# Patient Record
Sex: FEMALE | Race: WHITE | NOT HISPANIC OR LATINO | Employment: FULL TIME | URBAN - METROPOLITAN AREA
[De-identification: names, ages, dates, MRNs, and addresses within clinical notes are randomized per-mention and may not be internally consistent; named-entity substitution may affect disease eponyms.]

---

## 2018-06-04 ENCOUNTER — PATIENT OUTREACH (OUTPATIENT)
Dept: ADMINISTRATIVE | Facility: HOSPITAL | Age: 58
End: 2018-06-04

## 2018-06-04 NOTE — LETTER
June 4, 2018        Zully Jerry  [address]  [place] [address]      Dear Mrs. Jerry,    [facility] is committed to your overall health.  To help you get the most out of each of your visits, we will review your information to make sure you are up to date on all of your recommended tests and/or procedures.      DR. CHAN MCGILL has found that you may be due for   Health Maintenance Due   Topic    TETANUS VACCINE     Colonoscopy     DEXA SCAN     Pap Smear     Lipid Panel     Mammogram     Zoster Vaccine         If you have had any of the above done at another facility, please bring the records or information with you so that your record at [facility] will be complete.    We will be happy to assist you with scheduling any necessary appointments or you may contact the [facility] appointment desk at [phone] to schedule at your convenience.     Thank you for choosing [facility] for your healthcare needs,    Giovana REDDY, LPN Care Coordinator  [facility]  [phone]

## 2020-06-03 ENCOUNTER — HISTORICAL (OUTPATIENT)
Dept: URGENT CARE | Facility: CLINIC | Age: 60
End: 2020-06-03

## 2020-06-05 LAB — FINAL CULTURE: NORMAL

## 2020-11-20 ENCOUNTER — TELEPHONE (OUTPATIENT)
Dept: DERMATOLOGY | Facility: CLINIC | Age: 60
End: 2020-11-20

## 2022-04-07 ENCOUNTER — HISTORICAL (OUTPATIENT)
Dept: ADMINISTRATIVE | Facility: HOSPITAL | Age: 62
End: 2022-04-07
Payer: MEDICAID

## 2022-04-16 ENCOUNTER — HOSPITAL ENCOUNTER (INPATIENT)
Facility: HOSPITAL | Age: 62
LOS: 4 days | Discharge: HOME | DRG: 683 | End: 2022-04-20
Attending: EMERGENCY MEDICINE | Admitting: STUDENT IN AN ORGANIZED HEALTH CARE EDUCATION/TRAINING PROGRAM
Payer: MEDICARE

## 2022-04-16 DIAGNOSIS — R10.9 ABDOMINAL PAIN: ICD-10-CM

## 2022-04-16 DIAGNOSIS — F19.10 SUBSTANCE ABUSE: ICD-10-CM

## 2022-04-16 DIAGNOSIS — N17.9 ACUTE RENAL FAILURE, UNSPECIFIED ACUTE RENAL FAILURE TYPE: Primary | ICD-10-CM

## 2022-04-16 DIAGNOSIS — N17.9 AKI (ACUTE KIDNEY INJURY): ICD-10-CM

## 2022-04-16 DIAGNOSIS — F19.90 ILLICIT DRUG USE: ICD-10-CM

## 2022-04-16 LAB
ALBUMIN SERPL BCP-MCNC: 2.7 G/DL (ref 3.5–5.2)
ALP SERPL-CCNC: 71 U/L (ref 55–135)
ALT SERPL W/O P-5'-P-CCNC: 41 U/L (ref 10–44)
AMPHET+METHAMPHET UR QL: ABNORMAL
AMYLASE SERPL-CCNC: 87 U/L (ref 20–110)
ANION GAP SERPL CALC-SCNC: 12 MMOL/L (ref 8–16)
AST SERPL-CCNC: 39 U/L (ref 10–40)
BACTERIA #/AREA URNS HPF: NEGATIVE /HPF
BARBITURATES UR QL SCN>200 NG/ML: NEGATIVE
BASOPHILS NFR BLD: 0 % (ref 0–1.9)
BENZODIAZ UR QL SCN>200 NG/ML: ABNORMAL
BILIRUB SERPL-MCNC: 0.4 MG/DL (ref 0.1–1)
BILIRUB UR QL STRIP: ABNORMAL
BUN SERPL-MCNC: 81 MG/DL (ref 8–23)
BZE UR QL SCN: ABNORMAL
CALCIUM SERPL-MCNC: 9 MG/DL (ref 8.7–10.5)
CANNABINOIDS UR QL SCN: NEGATIVE
CHLORIDE SERPL-SCNC: 105 MMOL/L (ref 95–110)
CLARITY UR: ABNORMAL
CO2 SERPL-SCNC: 23 MMOL/L (ref 23–29)
COLOR UR: YELLOW
CREAT SERPL-MCNC: 5.8 MG/DL (ref 0.5–1.4)
CREAT UR-MCNC: 203 MG/DL (ref 15–325)
CTP QC/QA: YES
DIFFERENTIAL METHOD: ABNORMAL
EOSINOPHIL NFR BLD: 0 % (ref 0–8)
ERYTHROCYTE [DISTWIDTH] IN BLOOD BY AUTOMATED COUNT: 13.2 % (ref 11.5–14.5)
EST. GFR  (AFRICAN AMERICAN): 8.2 ML/MIN/1.73 M^2
EST. GFR  (NON AFRICAN AMERICAN): 7.1 ML/MIN/1.73 M^2
ETHANOL SERPL-MCNC: <3 MG/DL
GLUCOSE SERPL-MCNC: 140 MG/DL (ref 70–110)
GLUCOSE UR QL STRIP: NEGATIVE
GRAN CASTS #/AREA URNS LPF: 70.6 /LPF
HCT VFR BLD AUTO: 36.4 % (ref 37–48.5)
HGB BLD-MCNC: 12.1 G/DL (ref 12–16)
HGB UR QL STRIP: NEGATIVE
HYALINE CASTS #/AREA URNS LPF: 0 /LPF
IMM GRANULOCYTES # BLD AUTO: ABNORMAL K/UL (ref 0–0.04)
IMM GRANULOCYTES NFR BLD AUTO: ABNORMAL % (ref 0–0.5)
KETONES UR QL STRIP: ABNORMAL
LACTATE SERPL-SCNC: 2.4 MMOL/L (ref 0.5–2.2)
LACTATE SERPL-SCNC: 2.4 MMOL/L (ref 0.5–2.2)
LEUKOCYTE ESTERASE UR QL STRIP: ABNORMAL
LIPASE SERPL-CCNC: 116 U/L (ref 23–300)
LYMPHOCYTES NFR BLD: 2 % (ref 18–48)
MCH RBC QN AUTO: 28.7 PG (ref 27–31)
MCHC RBC AUTO-ENTMCNC: 33.2 G/DL (ref 32–36)
MCV RBC AUTO: 86 FL (ref 82–98)
METAMYELOCYTES NFR BLD MANUAL: 3 %
METHADONE UR QL SCN>300 NG/ML: NEGATIVE
MICROSCOPIC COMMENT: ABNORMAL
MONOCYTES NFR BLD: 0 % (ref 4–15)
MYELOCYTES NFR BLD MANUAL: 1 %
NEUTROPHILS NFR BLD: 72 % (ref 38–73)
NEUTS BAND NFR BLD MANUAL: 22 %
NITRITE UR QL STRIP: NEGATIVE
NRBC BLD-RTO: 0 /100 WBC
OPIATES UR QL SCN: NEGATIVE
PCP UR QL SCN>25 NG/ML: NEGATIVE
PH UR STRIP: 5 [PH] (ref 5–8)
PLATELET # BLD AUTO: 130 K/UL (ref 150–450)
PMV BLD AUTO: 13.4 FL (ref 9.2–12.9)
POTASSIUM SERPL-SCNC: 4 MMOL/L (ref 3.5–5.1)
PROT SERPL-MCNC: 6.7 G/DL (ref 6–8.4)
PROT UR QL STRIP: ABNORMAL
RBC # BLD AUTO: 4.22 M/UL (ref 4–5.4)
RBC #/AREA URNS HPF: 13 /HPF (ref 0–4)
SARS-COV-2 RDRP RESP QL NAA+PROBE: NEGATIVE
SODIUM SERPL-SCNC: 140 MMOL/L (ref 136–145)
SP GR UR STRIP: 1.01 (ref 1–1.03)
SQUAMOUS #/AREA URNS HPF: 12 /HPF
TOXICOLOGY INFORMATION: ABNORMAL
TROPONIN I SERPL DL<=0.01 NG/ML-MCNC: 10.6 PG/ML (ref 0–60)
URN SPEC COLLECT METH UR: ABNORMAL
UROBILINOGEN UR STRIP-ACNC: 1 EU/DL
WBC # BLD AUTO: 37.28 K/UL (ref 3.9–12.7)
WBC #/AREA URNS HPF: 64 /HPF (ref 0–5)
YEAST URNS QL MICRO: ABNORMAL

## 2022-04-16 PROCEDURE — 25000003 PHARM REV CODE 250: Performed by: EMERGENCY MEDICINE

## 2022-04-16 PROCEDURE — 20000000 HC ICU ROOM

## 2022-04-16 PROCEDURE — 83690 ASSAY OF LIPASE: CPT | Performed by: EMERGENCY MEDICINE

## 2022-04-16 PROCEDURE — 83605 ASSAY OF LACTIC ACID: CPT | Mod: 91 | Performed by: EMERGENCY MEDICINE

## 2022-04-16 PROCEDURE — U0002 COVID-19 LAB TEST NON-CDC: HCPCS | Performed by: EMERGENCY MEDICINE

## 2022-04-16 PROCEDURE — 80307 DRUG TEST PRSMV CHEM ANLYZR: CPT | Performed by: EMERGENCY MEDICINE

## 2022-04-16 PROCEDURE — 36415 COLL VENOUS BLD VENIPUNCTURE: CPT | Performed by: INTERNAL MEDICINE

## 2022-04-16 PROCEDURE — 85027 COMPLETE CBC AUTOMATED: CPT | Performed by: EMERGENCY MEDICINE

## 2022-04-16 PROCEDURE — 84145 PROCALCITONIN (PCT): CPT | Performed by: INTERNAL MEDICINE

## 2022-04-16 PROCEDURE — 82077 ASSAY SPEC XCP UR&BREATH IA: CPT | Performed by: EMERGENCY MEDICINE

## 2022-04-16 PROCEDURE — 96361 HYDRATE IV INFUSION ADD-ON: CPT

## 2022-04-16 PROCEDURE — 99285 EMERGENCY DEPT VISIT HI MDM: CPT | Mod: 25

## 2022-04-16 PROCEDURE — 84484 ASSAY OF TROPONIN QUANT: CPT | Performed by: EMERGENCY MEDICINE

## 2022-04-16 PROCEDURE — 81000 URINALYSIS NONAUTO W/SCOPE: CPT | Performed by: EMERGENCY MEDICINE

## 2022-04-16 PROCEDURE — 87040 BLOOD CULTURE FOR BACTERIA: CPT | Performed by: EMERGENCY MEDICINE

## 2022-04-16 PROCEDURE — 80053 COMPREHEN METABOLIC PANEL: CPT | Performed by: EMERGENCY MEDICINE

## 2022-04-16 PROCEDURE — 82150 ASSAY OF AMYLASE: CPT | Performed by: EMERGENCY MEDICINE

## 2022-04-16 PROCEDURE — 63600175 PHARM REV CODE 636 W HCPCS: Performed by: EMERGENCY MEDICINE

## 2022-04-16 PROCEDURE — 36415 COLL VENOUS BLD VENIPUNCTURE: CPT | Performed by: EMERGENCY MEDICINE

## 2022-04-16 PROCEDURE — 85007 BL SMEAR W/DIFF WBC COUNT: CPT | Performed by: EMERGENCY MEDICINE

## 2022-04-16 PROCEDURE — 96374 THER/PROPH/DIAG INJ IV PUSH: CPT

## 2022-04-16 PROCEDURE — 87086 URINE CULTURE/COLONY COUNT: CPT | Performed by: EMERGENCY MEDICINE

## 2022-04-16 PROCEDURE — 51702 INSERT TEMP BLADDER CATH: CPT

## 2022-04-16 RX ORDER — DIPHENHYDRAMINE HCL 25 MG
25 CAPSULE ORAL NIGHTLY PRN
Status: DISCONTINUED | OUTPATIENT
Start: 2022-04-16 | End: 2022-04-18

## 2022-04-16 RX ORDER — ONDANSETRON 2 MG/ML
4 INJECTION INTRAMUSCULAR; INTRAVENOUS EVERY 8 HOURS PRN
Status: DISCONTINUED | OUTPATIENT
Start: 2022-04-16 | End: 2022-04-19

## 2022-04-16 RX ORDER — SODIUM CHLORIDE 0.9 % (FLUSH) 0.9 %
10 SYRINGE (ML) INJECTION
Status: DISCONTINUED | OUTPATIENT
Start: 2022-04-16 | End: 2022-04-20 | Stop reason: HOSPADM

## 2022-04-16 RX ORDER — SODIUM CHLORIDE 9 MG/ML
INJECTION, SOLUTION INTRAVENOUS CONTINUOUS
Status: DISCONTINUED | OUTPATIENT
Start: 2022-04-16 | End: 2022-04-20 | Stop reason: HOSPADM

## 2022-04-16 RX ORDER — ALPRAZOLAM 0.5 MG/1
0.5 TABLET ORAL
Status: COMPLETED | OUTPATIENT
Start: 2022-04-16 | End: 2022-04-16

## 2022-04-16 RX ORDER — FAMOTIDINE 20 MG/1
20 TABLET, FILM COATED ORAL DAILY
Status: DISCONTINUED | OUTPATIENT
Start: 2022-04-17 | End: 2022-04-20 | Stop reason: HOSPADM

## 2022-04-16 RX ORDER — NOREPINEPHRINE BITARTRATE/D5W 4MG/250ML
0-3 PLASTIC BAG, INJECTION (ML) INTRAVENOUS CONTINUOUS
Status: DISPENSED | OUTPATIENT
Start: 2022-04-16 | End: 2022-04-17

## 2022-04-16 RX ADMIN — SODIUM CHLORIDE: 0.9 INJECTION, SOLUTION INTRAVENOUS at 10:04

## 2022-04-16 RX ADMIN — SODIUM CHLORIDE 1000 ML: 0.9 INJECTION, SOLUTION INTRAVENOUS at 04:04

## 2022-04-16 RX ADMIN — SODIUM CHLORIDE 1000 ML: 0.9 INJECTION, SOLUTION INTRAVENOUS at 01:04

## 2022-04-16 RX ADMIN — PIPERACILLIN AND TAZOBACTAM 4.5 G: 4; .5 INJECTION, POWDER, LYOPHILIZED, FOR SOLUTION INTRAVENOUS; PARENTERAL at 06:04

## 2022-04-16 RX ADMIN — ALPRAZOLAM 0.5 MG: 0.5 TABLET ORAL at 06:04

## 2022-04-16 RX ADMIN — SODIUM CHLORIDE 1000 ML: 0.9 INJECTION, SOLUTION INTRAVENOUS at 06:04

## 2022-04-16 RX ADMIN — PIPERACILLIN AND TAZOBACTAM 4.5 G: 4; .5 INJECTION, POWDER, LYOPHILIZED, FOR SOLUTION INTRAVENOUS; PARENTERAL at 10:04

## 2022-04-16 NOTE — Clinical Note
Diagnosis: Acute renal failure, unspecified acute renal failure type [[id number]]   Admitting Provider:: JEAN PIERRE SMITH [[id number]]   Future Attending Provider: DIANNE LYONS JR [[id number]]   Reason for IP Medical Treatment  (Clinical interventions that can only be accomplished in the IP setting? ) :: Acute renal failure, substance abuse   Estimated Length of Stay:: 2 midnights   I certify that Inpatient services for greater than or equal to 2 midnights are medically necessary:: Yes   Plans for Post-Acute care--if anticipated (pick the single best option):: A. No post acute care anticipated at this time   Special Needs:: No Special Needs [1]

## 2022-04-16 NOTE — ED PROVIDER NOTES
"Encounter Date: 2022       History     Chief Complaint   Patient presents with    Abdominal Pain     Complains of generalized abd pain x 1 week. States she has hx of pancreatitis and states she has "flare ups." Reports nausea and vomiting last night. Weakness.     This 62-year-old female with history of bipolar disorder, GERD, hyperlipidemia, hypertension presents the emergency department with multiple episodes of pancreatitis over the past few days.  Complaining of epigastric pain as well as some nausea and vomiting.  On and off for the past few weeks.  Complaining of generalized weakness as well.  She is not ill appearing, alert oriented x4, GCS is 15. She is very talkative.          Review of patient's allergies indicates:  No Known Allergies  Past Medical History:   Diagnosis Date    Bipolar affective     [facility]    Degenerative disc disease     cervical spine    GERD (gastroesophageal reflux disease)     Hepatitis C     Genotype 1a, no previous tx, G1S1 on Bx 2010    Hyperlipidemia     Hypertension     Migraine headache     Raynaud's disease      Past Surgical History:   Procedure Laterality Date    BACK SURGERY      BREAST SURGERY      lumpectomy    TUBAL LIGATION      one tube and ovary removed for cyst     Family History   Problem Relation Age of Onset    Hyperlipidemia Mother     Diabetes Mother     Kidney disease Mother     Heart disease Mother     Heart disease Father     Diabetes Father     Cancer Sister         not sure  but knows its female Ca.    Cancer Paternal Grandmother         uterine Cancer     Social History     Tobacco Use    Smoking status: Former Smoker     Packs/day: 0.25     Types: Cigarettes     Quit date: 10/16/2014     Years since quittin.5    Smokeless tobacco: Never Used   Substance Use Topics    Alcohol use: No     Comment: quit ~10-12 yrs ago; previously heavy use    Drug use: No     Review of Systems   Constitutional: " Negative for fever.   HENT: Negative for sore throat.    Respiratory: Negative for shortness of breath.    Cardiovascular: Negative for chest pain.   Gastrointestinal: Positive for abdominal pain. Negative for nausea.   Genitourinary: Negative for dysuria.   Musculoskeletal: Negative for back pain.   Skin: Negative for rash.   Neurological: Negative for weakness.   Hematological: Does not bruise/bleed easily.       Physical Exam     Initial Vitals [04/16/22 1317]   BP Pulse Resp Temp SpO2   (!) 75/61 107 (!) 22 97.8 °F (36.6 °C) 98 %      MAP       --         Physical Exam    Nursing note and vitals reviewed.  Constitutional: She appears well-developed and well-nourished. She is not diaphoretic. No distress.   HENT:   Head: Normocephalic and atraumatic.   Eyes: Conjunctivae and EOM are normal. Pupils are equal, round, and reactive to light.   Neck: Neck supple.   Normal range of motion.  Cardiovascular: Normal rate, regular rhythm, normal heart sounds and intact distal pulses.   No murmur heard.  Pulmonary/Chest: Breath sounds normal. No respiratory distress. She has no wheezes. She has no rhonchi. She has no rales. She exhibits no tenderness.   Abdominal: Abdomen is soft. Bowel sounds are normal. She exhibits no distension. There is abdominal tenderness. There is no rebound and no guarding.   Musculoskeletal:         General: No tenderness or edema. Normal range of motion.      Cervical back: Normal range of motion and neck supple.     Neurological: She is alert and oriented to person, place, and time. She has normal strength. No cranial nerve deficit. GCS score is 15. GCS eye subscore is 4. GCS verbal subscore is 5. GCS motor subscore is 6.   Skin: Skin is warm and dry. Capillary refill takes less than 2 seconds.         ED Course   Procedures  Labs Reviewed   CBC W/ AUTO DIFFERENTIAL - Abnormal; Notable for the following components:       Result Value    WBC 37.28 (*)     Hematocrit 36.4 (*)     Platelets 130 (*)      MPV 13.4 (*)     Lymph % 2.0 (*)     Mono % 0.0 (*)     All other components within normal limits   COMPREHENSIVE METABOLIC PANEL - Abnormal; Notable for the following components:    Glucose 140 (*)     BUN 81 (*)     Creatinine 5.8 (*)     Albumin 2.7 (*)     eGFR if  8.2 (*)     eGFR if non  7.1 (*)     All other components within normal limits   LACTIC ACID, PLASMA - Abnormal; Notable for the following components:    Lactate (Lactic Acid) 2.4 (*)     All other components within normal limits   URINALYSIS, REFLEX TO URINE CULTURE - Abnormal; Notable for the following components:    Appearance, UA Cloudy (*)     Protein, UA 2+ (*)     Ketones, UA Trace (*)     Bilirubin (UA) 2+ (*)     Leukocytes, UA 2+ (*)     All other components within normal limits    Narrative:     Preferred Collection Type->Urine, Clean Catch  Specimen Source->Urine   DRUG SCREEN PANEL, URINE EMERGENCY - Abnormal; Notable for the following components:    Benzodiazepines Presumptive Positive (*)     Cocaine (Metab.) Presumptive Positive (*)     Amphetamine Screen, Ur Presumptive Positive (*)     All other components within normal limits    Narrative:     Preferred Collection Type->Urine, Clean Catch  Specimen Source->Urine   URINALYSIS MICROSCOPIC - Abnormal; Notable for the following components:    RBC, UA 13 (*)     WBC, UA 64 (*)     Yeast, UA Rare (*)     Granular Casts, UA 70.6 (*)     All other components within normal limits    Narrative:     Preferred Collection Type->Urine, Clean Catch  Specimen Source->Urine   CULTURE, BLOOD   CULTURE, BLOOD   CULTURE, URINE   AMYLASE   LIPASE   TROPONIN I HIGH SENSITIVITY   ALCOHOL,MEDICAL (ETHANOL)   SARS-COV-2 RDRP GENE          Imaging Results          CT Abdomen Pelvis  Without Contrast (Final result)  Result time 04/16/22 17:03:18    Final result by Fercho Solares MD (04/16/22 17:03:18)                 Impression:      1. No acute inflammatory process  detected within the abdomen or pelvis on this noncontrast study.  2. Nonobstructing caliceal calculi within bilateral kidneys.      Electronically signed by: Fercho Solares MD  Date:    04/16/2022  Time:    17:03             Narrative:    EXAMINATION:  CT ABDOMEN PELVIS WITHOUT CONTRAST    CLINICAL HISTORY:  Abdominal pain, acute, nonlocalized;    TECHNIQUE:  Axial CT images were obtained from the lung bases through the pelvis without oral or intravenous contrast.  Multiplanar reconstructions evaluated.  Iterative reconstruction technique was used.  CT/Cardiac nuclear examinations in the past 12 months: 0    COMPARISON:  No priors available    FINDINGS:  Mild dependent atelectatic change in the lower lobes.  Lack of intravenous and oral contrast limits evaluation of the abdominal and pelvic structures.  Unenhanced liver, spleen, adrenals, pancreas are unremarkable.  The gallbladder is either collapsed or absent.  Two 2 mm caliceal calculi within the right kidney.  Two 2 mm caliceal calculi left kidney.  1.4 cm cyst within the left kidney.  Collapsed urinary bladder.  No bladder wall thickening or radiodense bladder calculi.  No bowel wall thickening or pattern of bowel obstruction.  Normal appendix.  No adenopathy, free air, or free fluid.  Spondylotic change at the lumbosacral junction.                                 Medications   sodium chloride 0.9% bolus 1,000 mL (0 mLs Intravenous Stopped 4/16/22 1620)   sodium chloride 0.9% bolus 1,000 mL (1,000 mLs Intravenous New Bag 4/16/22 1620)     Medical Decision Making:   Differential Diagnosis:   Pancreatitis, generalized abdominal pain  ED Management:  Patient with mild abdominal pain, mild hypotension and acute renal failure.  UDS is positive for benzodiazepines, cocaine and methamphetamine.  Patient denies cocaine and methamphetamine use.  On reexamination, her abdomen is soft, nonsurgical abdomen.  Her white count is elevated, but she is afebrile, questionable  stress reaction.  CT scan of abdomen was negative for acute process.  Will admit here for IV fluids, nephrology consult, and repeat labs.             ED Course as of 04/16/22 1737   Sat Apr 16, 2022   1429 WBC(!): 37.28 [SD]   1429 BUN(!): 81 [SD]   1429 Creatinine(!): 5.8 [SD]   1429 Lactate, Dat(!): 2.4 [SD]   1723 Patient denying cocaine or amphetamine use [SD]   1728 Discussed case with Dr. Bah, recommends IV fluids, repeat labs, Nephrology consult [SD]      ED Course User Index  [SD] Cortez Thomas MD             Clinical Impression:   Final diagnoses:  [R10.9] Abdominal pain  [N17.9] Acute renal failure, unspecified acute renal failure type (Primary)  [F19.10] Substance abuse          ED Disposition Condition    Admit               Cortez Thomas MD  04/16/22 1735       Cortez Thomas MD  04/16/22 1735

## 2022-04-17 PROBLEM — N17.9 AKI (ACUTE KIDNEY INJURY): Status: ACTIVE | Noted: 2022-04-17

## 2022-04-17 PROBLEM — F19.90 ILLICIT DRUG USE: Status: ACTIVE | Noted: 2022-04-17

## 2022-04-17 LAB
ALBUMIN SERPL BCP-MCNC: 2.2 G/DL (ref 3.5–5.2)
ALP SERPL-CCNC: 84 U/L (ref 55–135)
ALT SERPL W/O P-5'-P-CCNC: 38 U/L (ref 10–44)
ANION GAP SERPL CALC-SCNC: 8 MMOL/L (ref 8–16)
AST SERPL-CCNC: 43 U/L (ref 10–40)
BASOPHILS # BLD AUTO: ABNORMAL K/UL (ref 0–0.2)
BASOPHILS NFR BLD: 0 % (ref 0–1.9)
BILIRUB SERPL-MCNC: 0.3 MG/DL (ref 0.1–1)
BUN SERPL-MCNC: 73 MG/DL (ref 8–23)
CALCIUM SERPL-MCNC: 8.3 MG/DL (ref 8.7–10.5)
CHLORIDE SERPL-SCNC: 118 MMOL/L (ref 95–110)
CHLORIDE UR-SCNC: 90 MMOL/L (ref 25–200)
CO2 SERPL-SCNC: 19 MMOL/L (ref 23–29)
CREAT SERPL-MCNC: 3.9 MG/DL (ref 0.5–1.4)
CREAT UR-MCNC: 83.9 MG/DL (ref 15–325)
DIFFERENTIAL METHOD: ABNORMAL
EOSINOPHIL # BLD AUTO: ABNORMAL K/UL (ref 0–0.5)
EOSINOPHIL NFR BLD: 0 % (ref 0–8)
ERYTHROCYTE [DISTWIDTH] IN BLOOD BY AUTOMATED COUNT: 13.5 % (ref 11.5–14.5)
EST. GFR  (AFRICAN AMERICAN): 13.3 ML/MIN/1.73 M^2
EST. GFR  (NON AFRICAN AMERICAN): 11.5 ML/MIN/1.73 M^2
GLUCOSE SERPL-MCNC: 100 MG/DL (ref 70–110)
HCT VFR BLD AUTO: 34.7 % (ref 37–48.5)
HGB BLD-MCNC: 11.5 G/DL (ref 12–16)
IMM GRANULOCYTES # BLD AUTO: ABNORMAL K/UL (ref 0–0.04)
IMM GRANULOCYTES NFR BLD AUTO: ABNORMAL % (ref 0–0.5)
LYMPHOCYTES # BLD AUTO: ABNORMAL K/UL (ref 1–4.8)
LYMPHOCYTES NFR BLD: 6 % (ref 18–48)
MCH RBC QN AUTO: 28.9 PG (ref 27–31)
MCHC RBC AUTO-ENTMCNC: 33.1 G/DL (ref 32–36)
MCV RBC AUTO: 87 FL (ref 82–98)
METAMYELOCYTES NFR BLD MANUAL: 2 %
MONOCYTES # BLD AUTO: ABNORMAL K/UL (ref 0.3–1)
MONOCYTES NFR BLD: 4 % (ref 4–15)
NEUTROPHILS NFR BLD: 78 % (ref 38–73)
NEUTS BAND NFR BLD MANUAL: 10 %
NRBC BLD-RTO: 0 /100 WBC
PLATELET # BLD AUTO: 108 K/UL (ref 150–450)
PMV BLD AUTO: 13.8 FL (ref 9.2–12.9)
POTASSIUM SERPL-SCNC: 4.5 MMOL/L (ref 3.5–5.1)
POTASSIUM UR-SCNC: 22 MMOL/L (ref 15–95)
PROCALCITONIN SERPL IA-MCNC: 47.23 NG/ML
PROT SERPL-MCNC: 5.9 G/DL (ref 6–8.4)
PROT UR-MCNC: 51.6 MG/DL (ref 0–15)
PROT/CREAT UR: 0.62 MG/G{CREAT} (ref 0–0.2)
RBC # BLD AUTO: 3.98 M/UL (ref 4–5.4)
SODIUM SERPL-SCNC: 145 MMOL/L (ref 136–145)
SODIUM UR-SCNC: 114 MMOL/L (ref 20–250)
WBC # BLD AUTO: 18.45 K/UL (ref 3.9–12.7)

## 2022-04-17 PROCEDURE — 63600175 PHARM REV CODE 636 W HCPCS: Performed by: EMERGENCY MEDICINE

## 2022-04-17 PROCEDURE — 84300 ASSAY OF URINE SODIUM: CPT | Performed by: INTERNAL MEDICINE

## 2022-04-17 PROCEDURE — 80053 COMPREHEN METABOLIC PANEL: CPT | Performed by: EMERGENCY MEDICINE

## 2022-04-17 PROCEDURE — 82436 ASSAY OF URINE CHLORIDE: CPT | Performed by: INTERNAL MEDICINE

## 2022-04-17 PROCEDURE — 84166 PROTEIN E-PHORESIS/URINE/CSF: CPT | Performed by: INTERNAL MEDICINE

## 2022-04-17 PROCEDURE — 84133 ASSAY OF URINE POTASSIUM: CPT | Performed by: INTERNAL MEDICINE

## 2022-04-17 PROCEDURE — 25000003 PHARM REV CODE 250: Performed by: EMERGENCY MEDICINE

## 2022-04-17 PROCEDURE — 25000003 PHARM REV CODE 250: Performed by: INTERNAL MEDICINE

## 2022-04-17 PROCEDURE — 85027 COMPLETE CBC AUTOMATED: CPT | Performed by: EMERGENCY MEDICINE

## 2022-04-17 PROCEDURE — 36415 COLL VENOUS BLD VENIPUNCTURE: CPT | Performed by: EMERGENCY MEDICINE

## 2022-04-17 PROCEDURE — 63600175 PHARM REV CODE 636 W HCPCS: Performed by: INTERNAL MEDICINE

## 2022-04-17 PROCEDURE — 85007 BL SMEAR W/DIFF WBC COUNT: CPT | Performed by: EMERGENCY MEDICINE

## 2022-04-17 PROCEDURE — 51702 INSERT TEMP BLADDER CATH: CPT

## 2022-04-17 PROCEDURE — 20000000 HC ICU ROOM

## 2022-04-17 PROCEDURE — 84166 PATHOLOGIST INTERPRETATION UPE: ICD-10-PCS | Mod: 26,,, | Performed by: PATHOLOGY

## 2022-04-17 PROCEDURE — 84166 PROTEIN E-PHORESIS/URINE/CSF: CPT | Mod: 26,,, | Performed by: PATHOLOGY

## 2022-04-17 PROCEDURE — 84156 ASSAY OF PROTEIN URINE: CPT | Performed by: INTERNAL MEDICINE

## 2022-04-17 RX ORDER — LORAZEPAM 1 MG/1
2 TABLET ORAL EVERY 4 HOURS PRN
Status: DISCONTINUED | OUTPATIENT
Start: 2022-04-17 | End: 2022-04-19

## 2022-04-17 RX ORDER — MUPIROCIN 20 MG/G
OINTMENT TOPICAL 2 TIMES DAILY
Status: DISCONTINUED | OUTPATIENT
Start: 2022-04-17 | End: 2022-04-20 | Stop reason: HOSPADM

## 2022-04-17 RX ORDER — HEPARIN SODIUM 5000 [USP'U]/ML
5000 INJECTION, SOLUTION INTRAVENOUS; SUBCUTANEOUS EVERY 12 HOURS
Status: DISCONTINUED | OUTPATIENT
Start: 2022-04-17 | End: 2022-04-20 | Stop reason: HOSPADM

## 2022-04-17 RX ORDER — LORAZEPAM 1 MG/1
2 TABLET ORAL EVERY 4 HOURS
Status: DISCONTINUED | OUTPATIENT
Start: 2022-04-17 | End: 2022-04-17

## 2022-04-17 RX ORDER — FOLIC ACID 1 MG/1
1 TABLET ORAL DAILY
Status: DISCONTINUED | OUTPATIENT
Start: 2022-04-17 | End: 2022-04-20 | Stop reason: HOSPADM

## 2022-04-17 RX ORDER — PROMETHAZINE HYDROCHLORIDE 12.5 MG/1
12.5 TABLET ORAL EVERY 6 HOURS PRN
Status: DISCONTINUED | OUTPATIENT
Start: 2022-04-17 | End: 2022-04-20 | Stop reason: HOSPADM

## 2022-04-17 RX ORDER — MORPHINE SULFATE 2 MG/ML
1 INJECTION, SOLUTION INTRAMUSCULAR; INTRAVENOUS EVERY 4 HOURS PRN
Status: DISCONTINUED | OUTPATIENT
Start: 2022-04-17 | End: 2022-04-19

## 2022-04-17 RX ADMIN — FAMOTIDINE 20 MG: 20 TABLET ORAL at 08:04

## 2022-04-17 RX ADMIN — HEPARIN SODIUM 5000 UNITS: 5000 INJECTION INTRAVENOUS; SUBCUTANEOUS at 08:04

## 2022-04-17 RX ADMIN — MORPHINE SULFATE 1 MG: 2 INJECTION, SOLUTION INTRAMUSCULAR; INTRAVENOUS at 10:04

## 2022-04-17 RX ADMIN — SODIUM CHLORIDE: 0.9 INJECTION, SOLUTION INTRAVENOUS at 06:04

## 2022-04-17 RX ADMIN — SODIUM CHLORIDE: 0.9 INJECTION, SOLUTION INTRAVENOUS at 01:04

## 2022-04-17 RX ADMIN — FOLIC ACID 1 MG: 1 TABLET ORAL at 10:04

## 2022-04-17 RX ADMIN — LORAZEPAM 2 MG: 1 TABLET ORAL at 10:04

## 2022-04-17 RX ADMIN — ONDANSETRON HYDROCHLORIDE 4 MG: 2 SOLUTION INTRAMUSCULAR; INTRAVENOUS at 08:04

## 2022-04-17 RX ADMIN — MUPIROCIN: 20 OINTMENT TOPICAL at 10:04

## 2022-04-17 RX ADMIN — THERA TABS 1 TABLET: TAB at 10:04

## 2022-04-17 RX ADMIN — PIPERACILLIN AND TAZOBACTAM 4.5 G: 4; .5 INJECTION, POWDER, LYOPHILIZED, FOR SOLUTION INTRAVENOUS; PARENTERAL at 10:04

## 2022-04-17 RX ADMIN — MUPIROCIN: 20 OINTMENT TOPICAL at 08:04

## 2022-04-17 NOTE — EICU
Rounding (Video Assessment):  Yes    Intervention Initiated From:  [facility] / EICU    Comments: video rounds completed.  Patient asleep.  IVF @ 200 cc/hr. VS:  65, 145/82, 26, 97% room air

## 2022-04-17 NOTE — HPI
This is a 62-year-old female with a past medical history of Raynaud's phenomenon, illicit drug use, tobacco abuse, and pancreatitis who comes into our facility for nausea and vomiting.  She says the nausea and vomiting started about 36 hours ago.  She says that she does not like drinking water because it is tastes.  She says she must have vomited almost a dozen times and cannot tolerate liquids.  The pain in her abdomen started about a week ago but the nausea and vomiting started over the past has 36 hours.  She complains of a lot of abdominal pain and denies any sort of illicit drug use.  She has not used illicit drugs for several years.  The only medicines that she takes are prescribed to her legally.

## 2022-04-17 NOTE — EICU
EICU BRIEF ADMIT NOTE:    HISTORY:  Abdominal pain, Nausea, vomiting and SHEA Please refer to H/P and ER notes for detail    CAMERA ASSESSMENT: Two way audiovisual assessment was done: Yes    Telemetry was reviewed. Medical records including notes, labs and imaging were reviewed.Yes    DISCUSSED with bedside nurse.Yes    ASSESSMENT AND PLAN:    # Abdominal pain: Unclear etiology; CT abdomen (Nn contrast unremarkable). Pain control  # SHEA: likely due to hypovolemia. Already received 3 L NS, Plan to monitor   # Sepsis: Empirically started on Zosyn. Blood and urine cultures sent  # Hx Drug abuse: Urine toxicology positive      BEST PRACTICES REVIEW:    INTUBATED:   NO  GLYCEMIN CONTROL:  Diabetes:   No   STRESS ULCER PROPHYLAXIS: H2 antagonist   DVT PROPHYLAXIS:  Pharmacological    Thank You for allowing EICU to participate in the care of the patient. Please call as needed      Nick Aguirre MD  EICU  Critical Care Medicine

## 2022-04-17 NOTE — PLAN OF CARE
Problem: Adult Inpatient Plan of Care  Goal: Plan of Care Review  Outcome: Ongoing, Progressing  Goal: Patient-Specific Goal (Individualized)  Outcome: Ongoing, Progressing  Goal: Absence of Hospital-Acquired Illness or Injury  Outcome: Ongoing, Progressing  Goal: Optimal Comfort and Wellbeing  Outcome: Ongoing, Progressing  Goal: Readiness for Transition of Care  Outcome: Ongoing, Progressing     Problem: Infection  Goal: Absence of Infection Signs and Symptoms  Outcome: Ongoing, Progressing     Problem: Fluid and Electrolyte Imbalance (Acute Kidney Injury/Impairment)  Goal: Fluid and Electrolyte Balance  Outcome: Ongoing, Progressing     Problem: Renal Function Impairment (Acute Kidney Injury/Impairment)  Goal: Effective Renal Function  Outcome: Ongoing, Progressing     Problem: Skin Injury Risk Increased  Goal: Skin Health and Integrity  Outcome: Ongoing, Progressing

## 2022-04-17 NOTE — ASSESSMENT & PLAN NOTE
Patient denies history of illicit drug use but was positive for methamphetamines and cocaine.  Will place on WA protocol

## 2022-04-17 NOTE — ASSESSMENT & PLAN NOTE
Patient with acute kidney injury likely d/t Pre-renal azotemia Which is currently improving. Labs reviewed- Renal function/electrolytes with Estimated Creatinine Clearance: 10.2 mL/min (A) (based on SCr of 3.9 mg/dL (H)). according to latest data. Monitor urine output and serial BMP and adjust therapy as needed. Avoid nephrotoxins and renally dose meds for GFR listed above.   - will check urine electrolytes, urine protein creatinine ratio, renal ultrasound  - will continue aggressive IV hydration.  Likely pre renal  - nephrology consulted in the emergency department

## 2022-04-17 NOTE — EICU
Rounding (Video Assessment):  Yes    Comments: Video rounds completed. p lying in bed r/a noted. Bedside nurse in room. Pt requesting sleep medication DR Aguirre notified hr 74 bp 103/67 per monitor

## 2022-04-17 NOTE — SUBJECTIVE & OBJECTIVE
Interval History: No acute overnight events    Review of Systems   Constitutional:  Positive for appetite change, diaphoresis, fatigue and unexpected weight change.   HENT:  Positive for dental problem and sinus pain.    Respiratory:  Positive for shortness of breath.    Gastrointestinal:  Positive for abdominal pain and diarrhea.   Endocrine: Positive for cold intolerance.   Musculoskeletal:  Positive for arthralgias and myalgias.   Skin:  Positive for color change.   Objective:     Vital Signs (Most Recent):  Temp: 98.3 °F (36.8 °C) (04/17/22 0401)  Pulse: 63 (04/17/22 0900)  Resp: 18 (04/17/22 1003)  BP: (!) 157/104 (04/17/22 0900)  SpO2: 97 % (04/17/22 0900)   Vital Signs (24h Range):  Temp:  [97.6 °F (36.4 °C)-98.3 °F (36.8 °C)] 98.3 °F (36.8 °C)  Pulse:  [] 63  Resp:  [14-37] 18  SpO2:  [93 %-100 %] 97 %  BP: ()/() 157/104     Weight: 44.4 kg (97 lb 12.8 oz)  Body mass index is 17.89 kg/m².    Intake/Output Summary (Last 24 hours) at 4/17/2022 1145  Last data filed at 4/17/2022 0600  Gross per 24 hour   Intake 4568 ml   Output 700 ml   Net 3868 ml      Physical Exam  Constitutional:       Appearance: She is ill-appearing and diaphoretic.      Comments: Cachectic   HENT:      Head: Normocephalic.      Nose: Nose normal.   Eyes:      Pupils: Pupils are equal, round, and reactive to light.   Cardiovascular:      Rate and Rhythm: Tachycardia present.   Pulmonary:      Effort: Pulmonary effort is normal.      Breath sounds: Wheezing present.   Abdominal:      General: Abdomen is flat.      Tenderness: There is abdominal tenderness.   Musculoskeletal:         General: Normal range of motion.      Cervical back: Normal range of motion.   Skin:     General: Skin is warm.      Comments: Positive for Raynaud's   Neurological:      Mental Status: She is disoriented.   Psychiatric:         Attention and Perception: She is inattentive.         Speech: Speech is tangential.         Behavior: Behavior is  cooperative.         Cognition and Memory: Cognition is impaired.      Comments: Positive for lip smacking, uncontrolled will muscle movements in the upper lower extremities       Significant Labs: CBC:   Recent Labs   Lab 04/16/22  1355 04/17/22  0342   WBC 37.28* 18.45*   HGB 12.1 11.5*   HCT 36.4* 34.7*   * 108*     CMP:   Recent Labs   Lab 04/16/22  1356 04/17/22  0342    145   K 4.0 4.5    118*   CO2 23 19*   * 100   BUN 81* 73*   CREATININE 5.8* 3.9*   CALCIUM 9.0 8.3*   PROT 6.7 5.9*   ALBUMIN 2.7* 2.2*   BILITOT 0.4 0.3   ALKPHOS 71 84   AST 39 43*   ALT 41 38   ANIONGAP 12 8   EGFRNONAA 7.1* 11.5*       Significant Imaging: I have reviewed all pertinent imaging results/findings within the past 24 hours.

## 2022-04-17 NOTE — PLAN OF CARE
Patient is calm and slept well last night. E-ICU MD ordered a brantley cath to be placed. 16 fr brantley cath placed with aseptic technique. 400 CC of dark yellow urine obtained at time of placement. Order for Nephrology Consult placed, we have no Nephrology coverage for tonight. Will attempt again in the morning

## 2022-04-17 NOTE — H&P
[facility]  Progress Note    Patient Name: Zully Jerry  MRN: [id number]  Patient Class: IP- Inpatient   Admission Date: 4/16/2022  Length of Stay: 1 days  Attending Physician: Faustino Solaers Jr., MD  Primary Care Provider: Primary Doctor No        Subjective:     Principal Problem:SHEA (acute kidney injury)        HPI:  This is a 62-year-old female with a past medical history of Raynaud's phenomenon, illicit drug use, tobacco abuse, and pancreatitis who comes into our facility for nausea and vomiting.  She says the nausea and vomiting started about 36 hours ago.  She says that she does not like drinking water because it is tastes.  She says she must have vomited almost a dozen times and cannot tolerate liquids.  The pain in her abdomen started about a week ago but the nausea and vomiting started over the past has 36 hours.  She complains of a lot of abdominal pain and denies any sort of illicit drug use.  She has not used illicit drugs for several years.  The only medicines that she takes are prescribed to her legally.      Overview/Hospital Course:  4/17 CG:  CT scan of the abdomen and chest did not show any acute process.  There is no acute pancreatitis and nothing to suggest why she is having such extreme abdominal pain.  She was also positive for cocaine and methamphetamines.  She does have a pronounced acute kidney injury and she was aggressively rehydrated in the emergency department.      Interval History: No acute overnight events    Review of Systems   Constitutional:  Positive for appetite change, diaphoresis, fatigue and unexpected weight change.   HENT:  Positive for dental problem and sinus pain.    Respiratory:  Positive for shortness of breath.    Gastrointestinal:  Positive for abdominal pain and diarrhea.   Endocrine: Positive for cold intolerance.   Musculoskeletal:  Positive for arthralgias and myalgias.   Skin:  Positive for color change.   Objective:      Vital Signs (Most Recent):  Temp: 98.3 °F (36.8 °C) (04/17/22 0401)  Pulse: 63 (04/17/22 0900)  Resp: 18 (04/17/22 1003)  BP: (!) 157/104 (04/17/22 0900)  SpO2: 97 % (04/17/22 0900)   Vital Signs (24h Range):  Temp:  [97.6 °F (36.4 °C)-98.3 °F (36.8 °C)] 98.3 °F (36.8 °C)  Pulse:  [] 63  Resp:  [14-37] 18  SpO2:  [93 %-100 %] 97 %  BP: ()/() 157/104     Weight: 44.4 kg (97 lb 12.8 oz)  Body mass index is 17.89 kg/m².    Intake/Output Summary (Last 24 hours) at 4/17/2022 1145  Last data filed at 4/17/2022 0600  Gross per 24 hour   Intake 4568 ml   Output 700 ml   Net 3868 ml      Physical Exam  Constitutional:       Appearance: She is ill-appearing and diaphoretic.      Comments: Cachectic   HENT:      Head: Normocephalic.      Nose: Nose normal.   Eyes:      Pupils: Pupils are equal, round, and reactive to light.   Cardiovascular:      Rate and Rhythm: Tachycardia present.   Pulmonary:      Effort: Pulmonary effort is normal.      Breath sounds: Wheezing present.   Abdominal:      General: Abdomen is flat.      Tenderness: There is abdominal tenderness.   Musculoskeletal:         General: Normal range of motion.      Cervical back: Normal range of motion.   Skin:     General: Skin is warm.      Comments: Positive for Raynaud's   Neurological:      Mental Status: She is disoriented.   Psychiatric:         Attention and Perception: She is inattentive.         Speech: Speech is tangential.         Behavior: Behavior is cooperative.         Cognition and Memory: Cognition is impaired.      Comments: Positive for lip smacking, uncontrolled will muscle movements in the upper lower extremities       Significant Labs: CBC:   Recent Labs   Lab 04/16/22  1355 04/17/22  0342   WBC 37.28* 18.45*   HGB 12.1 11.5*   HCT 36.4* 34.7*   * 108*     CMP:   Recent Labs   Lab 04/16/22  1356 04/17/22  0342    145   K 4.0 4.5    118*   CO2 23 19*   * 100   BUN 81* 73*   CREATININE 5.8* 3.9*    CALCIUM 9.0 8.3*   PROT 6.7 5.9*   ALBUMIN 2.7* 2.2*   BILITOT 0.4 0.3   ALKPHOS 71 84   AST 39 43*   ALT 41 38   ANIONGAP 12 8   EGFRNONAA 7.1* 11.5*       Significant Imaging: I have reviewed all pertinent imaging results/findings within the past 24 hours.      Assessment/Plan:      * SHEA (acute kidney injury)  Patient with acute kidney injury likely d/t Pre-renal azotemia Which is currently improving. Labs reviewed- Renal function/electrolytes with Estimated Creatinine Clearance: 10.2 mL/min (A) (based on SCr of 3.9 mg/dL (H)). according to latest data. Monitor urine output and serial BMP and adjust therapy as needed. Avoid nephrotoxins and renally dose meds for GFR listed above.   - will check urine electrolytes, urine protein creatinine ratio, renal ultrasound  - will continue aggressive IV hydration.  Likely pre renal  - nephrology consulted in the emergency department    Illicit drug use  Patient denies history of illicit drug use but was positive for methamphetamines and cocaine.  Will place on CIWA protocol      Bipolar affective  Will need to restart home medications        VTE Risk Mitigation (From admission, onward)         Ordered     heparin (porcine) injection 5,000 Units  Every 12 hours         04/17/22 0018     IP VTE LOW RISK PATIENT  Once         04/16/22 2016     Place NII hose  Until discontinued         04/16/22 2016                Discharge Planning   DAVID:      Code Status: Full Code   Is the patient medically ready for discharge?:     Reason for patient still in hospital (select all that apply): Treatment                     Almas Bah DO  Department of Hospital Medicine   [place] - Intensive Care

## 2022-04-17 NOTE — NURSING
Pt on RA. Pt, at times, has difficulty communicating wants and needs, describing pain. Hard to get straight answer. A lot of lip smacking, jaw movement and head moving left to right. Pt has 2 daughters. One was present at bedside. Daughter stated mother used to live with her and caused more stress on her family in the home. Stated her mom has been to rehab in the past. Stated pt doctors she sees are in [place]. Was under the impression pt was on her death bed per her sister Daisy.

## 2022-04-18 LAB
ALBUMIN SERPL BCP-MCNC: 2.4 G/DL (ref 3.5–5.2)
ALP SERPL-CCNC: 69 U/L (ref 55–135)
ALT SERPL W/O P-5'-P-CCNC: 42 U/L (ref 10–44)
ANION GAP SERPL CALC-SCNC: 7 MMOL/L (ref 8–16)
AST SERPL-CCNC: 43 U/L (ref 10–40)
BACTERIA UR CULT: NO GROWTH
BASOPHILS # BLD AUTO: 0.02 K/UL (ref 0–0.2)
BASOPHILS NFR BLD: 0.2 % (ref 0–1.9)
BILIRUB SERPL-MCNC: 0.6 MG/DL (ref 0.1–1)
BUN SERPL-MCNC: 44 MG/DL (ref 8–23)
CALCIUM SERPL-MCNC: 8.9 MG/DL (ref 8.7–10.5)
CHLORIDE SERPL-SCNC: 119 MMOL/L (ref 95–110)
CO2 SERPL-SCNC: 18 MMOL/L (ref 23–29)
CREAT SERPL-MCNC: 1.9 MG/DL (ref 0.5–1.4)
DIFFERENTIAL METHOD: ABNORMAL
EOSINOPHIL # BLD AUTO: 0 K/UL (ref 0–0.5)
EOSINOPHIL NFR BLD: 0 % (ref 0–8)
ERYTHROCYTE [DISTWIDTH] IN BLOOD BY AUTOMATED COUNT: 13.5 % (ref 11.5–14.5)
EST. GFR  (AFRICAN AMERICAN): 31.7 ML/MIN/1.73 M^2
EST. GFR  (NON AFRICAN AMERICAN): 27.5 ML/MIN/1.73 M^2
GLUCOSE SERPL-MCNC: 117 MG/DL (ref 70–110)
HCT VFR BLD AUTO: 40.8 % (ref 37–48.5)
HGB BLD-MCNC: 13.7 G/DL (ref 12–16)
IMM GRANULOCYTES # BLD AUTO: 0.1 K/UL (ref 0–0.04)
IMM GRANULOCYTES NFR BLD AUTO: 0.8 % (ref 0–0.5)
LYMPHOCYTES # BLD AUTO: 0.6 K/UL (ref 1–4.8)
LYMPHOCYTES NFR BLD: 4.4 % (ref 18–48)
MCH RBC QN AUTO: 28.8 PG (ref 27–31)
MCHC RBC AUTO-ENTMCNC: 33.6 G/DL (ref 32–36)
MCV RBC AUTO: 86 FL (ref 82–98)
MONOCYTES # BLD AUTO: 0.3 K/UL (ref 0.3–1)
MONOCYTES NFR BLD: 2 % (ref 4–15)
NEUTROPHILS # BLD AUTO: 12.1 K/UL (ref 1.8–7.7)
NEUTROPHILS NFR BLD: 92.6 % (ref 38–73)
NRBC BLD-RTO: 0 /100 WBC
PLATELET # BLD AUTO: 127 K/UL (ref 150–450)
PMV BLD AUTO: 12.9 FL (ref 9.2–12.9)
POTASSIUM SERPL-SCNC: 4.5 MMOL/L (ref 3.5–5.1)
PROT PATTERN UR ELPH-IMP: NORMAL
PROT SERPL-MCNC: 6.7 G/DL (ref 6–8.4)
RBC # BLD AUTO: 4.75 M/UL (ref 4–5.4)
SODIUM SERPL-SCNC: 144 MMOL/L (ref 136–145)
WBC # BLD AUTO: 13.08 K/UL (ref 3.9–12.7)

## 2022-04-18 PROCEDURE — 85025 COMPLETE CBC W/AUTO DIFF WBC: CPT | Performed by: INTERNAL MEDICINE

## 2022-04-18 PROCEDURE — 20000000 HC ICU ROOM

## 2022-04-18 PROCEDURE — 36415 COLL VENOUS BLD VENIPUNCTURE: CPT | Performed by: INTERNAL MEDICINE

## 2022-04-18 PROCEDURE — 63600175 PHARM REV CODE 636 W HCPCS: Performed by: INTERNAL MEDICINE

## 2022-04-18 PROCEDURE — 25000003 PHARM REV CODE 250: Performed by: INTERNAL MEDICINE

## 2022-04-18 PROCEDURE — 25000003 PHARM REV CODE 250: Performed by: EMERGENCY MEDICINE

## 2022-04-18 PROCEDURE — 80053 COMPREHEN METABOLIC PANEL: CPT | Performed by: INTERNAL MEDICINE

## 2022-04-18 PROCEDURE — 63600175 PHARM REV CODE 636 W HCPCS: Performed by: EMERGENCY MEDICINE

## 2022-04-18 RX ORDER — ISOSORBIDE MONONITRATE 30 MG/1
30 TABLET, EXTENDED RELEASE ORAL DAILY
Status: DISCONTINUED | OUTPATIENT
Start: 2022-04-18 | End: 2022-04-20 | Stop reason: HOSPADM

## 2022-04-18 RX ADMIN — ISOSORBIDE MONONITRATE 30 MG: 30 TABLET, EXTENDED RELEASE ORAL at 10:04

## 2022-04-18 RX ADMIN — PIPERACILLIN AND TAZOBACTAM 4.5 G: 4; .5 INJECTION, POWDER, LYOPHILIZED, FOR SOLUTION INTRAVENOUS; PARENTERAL at 10:04

## 2022-04-18 RX ADMIN — THERA TABS 1 TABLET: TAB at 08:04

## 2022-04-18 RX ADMIN — FAMOTIDINE 20 MG: 20 TABLET ORAL at 08:04

## 2022-04-18 RX ADMIN — MUPIROCIN: 20 OINTMENT TOPICAL at 09:04

## 2022-04-18 RX ADMIN — MORPHINE SULFATE 1 MG: 2 INJECTION, SOLUTION INTRAMUSCULAR; INTRAVENOUS at 02:04

## 2022-04-18 RX ADMIN — SODIUM CHLORIDE: 0.9 INJECTION, SOLUTION INTRAVENOUS at 05:04

## 2022-04-18 RX ADMIN — HEPARIN SODIUM 5000 UNITS: 5000 INJECTION INTRAVENOUS; SUBCUTANEOUS at 08:04

## 2022-04-18 RX ADMIN — FOLIC ACID 1 MG: 1 TABLET ORAL at 08:04

## 2022-04-18 RX ADMIN — MUPIROCIN: 20 OINTMENT TOPICAL at 08:04

## 2022-04-18 RX ADMIN — MORPHINE SULFATE 1 MG: 2 INJECTION, SOLUTION INTRAMUSCULAR; INTRAVENOUS at 07:04

## 2022-04-18 RX ADMIN — HEPARIN SODIUM 5000 UNITS: 5000 INJECTION INTRAVENOUS; SUBCUTANEOUS at 09:04

## 2022-04-18 NOTE — PLAN OF CARE
[facility] - Intensive Care  Initial Discharge Assessment       Primary Care Provider: Primary Doctor No    Admission Diagnosis: Substance abuse [F19.10]  Abdominal pain [R10.9]  Acute renal failure, unspecified acute renal failure type [N17.9]    Admission Date: 4/16/2022  Expected Discharge Date:     Discharge Barriers Identified: None    Payor: [employer] MANAGED MEDICARE / Plan: AET[employer] MEDICARE DUAL DSNP / Product Type: Medicare Advantage /     Extended Emergency Contact Information  Primary Emergency Contact: Daisy Saleh  Address: [address] [place] [address] [place] [address] [facility]  Home Phone: [phone]  Mobile Phone: [phone]  Relation: Sister  Secondary Emergency Contact: Caden Jerry  Mobile Phone: [phone]  Relation: Daughter    Discharge Plan A: Home with family  Discharge Plan B: Other (drug rehab)      [id number] [place] - [address] AT [address]  [address] [place] [address]  Phone: [phone] Fax: [phone]      Initial Assessment (most recent)     Adult Discharge Assessment - 04/18/22 1113        Discharge Assessment    Assessment Type Discharge Planning Assessment     Confirmed/corrected address, phone number and insurance Yes     Source of Information family     Communicated DAVID with patient/caregiver No     Reason For Admission substance abuse     Lives With alone     Facility Arrived From: home     Do you expect to return to your current living situation? Yes     Do you have help at home or someone to help you manage your care at home? No     Prior to hospitilization cognitive status: Alert/Oriented     Current cognitive status: Coma/Sedated/Intubated   sleepy at this time    Walking or Climbing Stairs Difficulty none     Dressing/Bathing Difficulty none     Equipment Currently Used at Home none     Readmission within 30 days? No     Do you currently have service(s) that help you manage your care at home? No     Do you have any  problems affording any of your prescribed medications? TBD     Is the patient taking medications as prescribed? --   not known    How do you get to doctors appointments? family or friend will provide     Are you on dialysis? No     Do you take coumadin? No     Discharge Plan A Home with family     Discharge Plan B Other   drug rehab    DME Needed Upon Discharge  none     Discharge Plan discussed with: --   Patient's daughter and sister, patient is sleeping at this time    Discharge Barriers Identified None               Unable to speak with patient at this time, sleepy, Placed call to patient's daughter, Caden Jerry, She answers questions for DCP, but does not know name of pharmacy patient uses.  Called Patient's sister, Daisy Jacobs, Patient uses [facility].   No DMe at home, family is not certain of DCP,  Patient lives alone.  Potential discharge plan is home vs drug rehab.  Will discuss with SW to revisit patient when patient awake.

## 2022-04-18 NOTE — EICU
Rounding (Video Assessment):  yes  Comments: EICU rounding done. Pt in bed,appears to be sleeping.  Chart and meds reviewed.  VS stable.

## 2022-04-18 NOTE — PROGRESS NOTES
[facility] Medicine  Progress Note    Patient Name: Zully Jerry  MRN: [id number]  Patient Class: IP- Inpatient   Admission Date: 4/16/2022  Length of Stay: 2 days  Attending Physician: Faustino Solares Jr., MD  Primary Care Provider: Primary Doctor No        Subjective:     Principal Problem:SHEA (acute kidney injury)        HPI:  This is a 62-year-old female with a past medical history of Raynaud's phenomenon, illicit drug use, tobacco abuse, and pancreatitis who comes into our facility for nausea and vomiting.  She says the nausea and vomiting started about 36 hours ago.  She says that she does not like drinking water because it is tastes.  She says she must have vomited almost a dozen times and cannot tolerate liquids.  The pain in her abdomen started about a week ago but the nausea and vomiting started over the past has 36 hours.  She complains of a lot of abdominal pain and denies any sort of illicit drug use.  She has not used illicit drugs for several years.  The only medicines that she takes are prescribed to her legally.      Overview/Hospital Course:  4/17 CG:  CT scan of the abdomen and chest did not show any acute process.  There is no acute pancreatitis and nothing to suggest why she is having such extreme abdominal pain.  She was also positive for cocaine and methamphetamines.  She does have a pronounced acute kidney injury and she was aggressively rehydrated in the emergency department.  4/18:  renal function improved with fluids.  Patient remains lethargic and resting this morning.       Interval History: No acute overnight events    Review of Systems   Constitutional:  Positive for appetite change, diaphoresis, fatigue and unexpected weight change.   HENT:  Positive for dental problem and sinus pain.    Respiratory:  Positive for shortness of breath.    Gastrointestinal:  Positive for abdominal pain and diarrhea.   Endocrine: Positive for cold intolerance.    Musculoskeletal:  Positive for arthralgias and myalgias.   Skin:  Positive for color change.   Objective:     Vital Signs (Most Recent):  Temp: 98.9 °F (37.2 °C) (04/18/22 0710)  Pulse: 63 (04/18/22 0800)  Resp: (!) 24 (04/18/22 0800)  BP: (!) 187/106 (04/18/22 0800)  SpO2: 99 % (04/18/22 0800)   Vital Signs (24h Range):  Temp:  [97.6 °F (36.4 °C)-98.9 °F (37.2 °C)] 98.9 °F (37.2 °C)  Pulse:  [60-66] 63  Resp:  [18-27] 24  SpO2:  [95 %-100 %] 99 %  BP: (149-192)/() 187/106     Weight: 44.4 kg (97 lb 12.8 oz)  Body mass index is 17.89 kg/m².    Intake/Output Summary (Last 24 hours) at 4/18/2022 0914  Last data filed at 4/18/2022 0401  Gross per 24 hour   Intake 2487 ml   Output 2750 ml   Net -263 ml        Physical Exam  Constitutional:       Appearance: She is ill-appearing.      Comments: Cachectic   HENT:      Head: Normocephalic.      Nose: Nose normal.   Eyes:      Pupils: Pupils are equal, round, and reactive to light.   Cardiovascular:      Rate and Rhythm: Tachycardia present.   Pulmonary:      Effort: Pulmonary effort is normal.      Breath sounds: Wheezing present.   Abdominal:      General: Abdomen is flat.      Tenderness: There is abdominal tenderness.   Musculoskeletal:         General: Normal range of motion.      Cervical back: Normal range of motion.   Skin:     General: Skin is warm.      Comments: Positive for Raynaud's   Neurological:      Mental Status: She is disoriented.   Psychiatric:         Attention and Perception: She is inattentive.         Speech: Speech is tangential.         Behavior: Behavior is cooperative.         Cognition and Memory: Cognition is impaired.      Comments: Positive for lip smacking, uncontrolled will muscle movements in the upper lower extremities           Significant Labs: CBC:   Recent Labs   Lab 04/16/22  1355 04/17/22  0342 04/18/22  0539   WBC 37.28* 18.45* 13.08*   HGB 12.1 11.5* 13.7   HCT 36.4* 34.7* 40.8   * 108* 127*       CMP:   Recent  Labs   Lab 04/16/22  1356 04/17/22  0342 04/18/22  0539    145 144   K 4.0 4.5 4.5    118* 119*   CO2 23 19* 18*   * 100 117*   BUN 81* 73* 44*   CREATININE 5.8* 3.9* 1.9*   CALCIUM 9.0 8.3* 8.9   PROT 6.7 5.9* 6.7   ALBUMIN 2.7* 2.2* 2.4*   BILITOT 0.4 0.3 0.6   ALKPHOS 71 84 69   AST 39 43* 43*   ALT 41 38 42   ANIONGAP 12 8 7*   EGFRNONAA 7.1* 11.5* 27.5*         Significant Imaging: I have reviewed all pertinent imaging results/findings within the past 24 hours.      Assessment/Plan:      * SHEA (acute kidney injury)  Patient with acute kidney injury likely d/t Pre-renal azotemia Which is currently improving. Labs reviewed- Renal function/electrolytes with Estimated Creatinine Clearance: 21 mL/min (A) (based on SCr of 1.9 mg/dL (H)). according to latest data. Monitor urine output and serial BMP and adjust therapy as needed. Avoid nephrotoxins and renally dose meds for GFR listed above.   - will check urine electrolytes, urine protein creatinine ratio, renal ultrasound  - will continue aggressive IV hydration.  Likely pre renal  - nephrology consulted in the emergency department    Lab Results   Component Value Date    CREATININE 1.9 (H) 04/18/2022    CREATININE 3.9 (H) 04/17/2022    CREATININE 5.8 (H) 04/16/2022          Illicit drug use  Patient denies history of illicit drug use but was positive for methamphetamines and cocaine.  Will place on CIWA protocol      Bipolar affective  Will need to restart home medications        VTE Risk Mitigation (From admission, onward)         Ordered     heparin (porcine) injection 5,000 Units  Every 12 hours         04/17/22 0018     IP VTE LOW RISK PATIENT  Once         04/16/22 2016     Place NII hose  Until discontinued         04/16/22 2016                Discharge Planning   DAVID:      Code Status: Full Code   Is the patient medically ready for discharge?:     Reason for patient still in hospital (select all that apply): Treatment           Cc time 35  shaan Solares Jr, MD  Department of [facility] Medicine   [facility] - Intensive Care

## 2022-04-18 NOTE — PLAN OF CARE
Patient slept all night. She dose wake up to verbal stimulation,but goes back to sleep. No sedation given on this shift. BP is beginning to elevate. Phlebotomy was unable to draw her labs this AM, second phlebotomist will come and try later in the morning. SR on monitor, sats 100 % on room air.

## 2022-04-18 NOTE — NURSING
Pt AO,drowsy most of shift. Pt denies being hungry. Ultrasound of kidneys complete. Pending results. Fluids running. Adequate urine output.

## 2022-04-18 NOTE — ASSESSMENT & PLAN NOTE
Patient with acute kidney injury likely d/t Pre-renal azotemia Which is currently improving. Labs reviewed- Renal function/electrolytes with Estimated Creatinine Clearance: 21 mL/min (A) (based on SCr of 1.9 mg/dL (H)). according to latest data. Monitor urine output and serial BMP and adjust therapy as needed. Avoid nephrotoxins and renally dose meds for GFR listed above.   - will check urine electrolytes, urine protein creatinine ratio, renal ultrasound  - will continue aggressive IV hydration.  Likely pre renal  - nephrology consulted in the emergency department    Lab Results   Component Value Date    CREATININE 1.9 (H) 04/18/2022    CREATININE 3.9 (H) 04/17/2022    CREATININE 5.8 (H) 04/16/2022

## 2022-04-18 NOTE — SUBJECTIVE & OBJECTIVE
Interval History: No acute overnight events    Review of Systems   Constitutional:  Positive for appetite change, diaphoresis, fatigue and unexpected weight change.   HENT:  Positive for dental problem and sinus pain.    Respiratory:  Positive for shortness of breath.    Gastrointestinal:  Positive for abdominal pain and diarrhea.   Endocrine: Positive for cold intolerance.   Musculoskeletal:  Positive for arthralgias and myalgias.   Skin:  Positive for color change.   Objective:     Vital Signs (Most Recent):  Temp: 98.9 °F (37.2 °C) (04/18/22 0710)  Pulse: 63 (04/18/22 0800)  Resp: (!) 24 (04/18/22 0800)  BP: (!) 187/106 (04/18/22 0800)  SpO2: 99 % (04/18/22 0800)   Vital Signs (24h Range):  Temp:  [97.6 °F (36.4 °C)-98.9 °F (37.2 °C)] 98.9 °F (37.2 °C)  Pulse:  [60-66] 63  Resp:  [18-27] 24  SpO2:  [95 %-100 %] 99 %  BP: (149-192)/() 187/106     Weight: 44.4 kg (97 lb 12.8 oz)  Body mass index is 17.89 kg/m².    Intake/Output Summary (Last 24 hours) at 4/18/2022 0914  Last data filed at 4/18/2022 0401  Gross per 24 hour   Intake 2487 ml   Output 2750 ml   Net -263 ml        Physical Exam  Constitutional:       Appearance: She is ill-appearing.      Comments: Cachectic   HENT:      Head: Normocephalic.      Nose: Nose normal.   Eyes:      Pupils: Pupils are equal, round, and reactive to light.   Cardiovascular:      Rate and Rhythm: Tachycardia present.   Pulmonary:      Effort: Pulmonary effort is normal.      Breath sounds: Wheezing present.   Abdominal:      General: Abdomen is flat.      Tenderness: There is abdominal tenderness.   Musculoskeletal:         General: Normal range of motion.      Cervical back: Normal range of motion.   Skin:     General: Skin is warm.      Comments: Positive for Raynaud's   Neurological:      Mental Status: She is disoriented.   Psychiatric:         Attention and Perception: She is inattentive.         Speech: Speech is tangential.         Behavior: Behavior is  cooperative.         Cognition and Memory: Cognition is impaired.      Comments: Positive for lip smacking, uncontrolled will muscle movements in the upper lower extremities           Significant Labs: CBC:   Recent Labs   Lab 04/16/22  1355 04/17/22  0342 04/18/22  0539   WBC 37.28* 18.45* 13.08*   HGB 12.1 11.5* 13.7   HCT 36.4* 34.7* 40.8   * 108* 127*       CMP:   Recent Labs   Lab 04/16/22  1356 04/17/22  0342 04/18/22  0539    145 144   K 4.0 4.5 4.5    118* 119*   CO2 23 19* 18*   * 100 117*   BUN 81* 73* 44*   CREATININE 5.8* 3.9* 1.9*   CALCIUM 9.0 8.3* 8.9   PROT 6.7 5.9* 6.7   ALBUMIN 2.7* 2.2* 2.4*   BILITOT 0.4 0.3 0.6   ALKPHOS 71 84 69   AST 39 43* 43*   ALT 41 38 42   ANIONGAP 12 8 7*   EGFRNONAA 7.1* 11.5* 27.5*         Significant Imaging: I have reviewed all pertinent imaging results/findings within the past 24 hours.

## 2022-04-18 NOTE — PLAN OF CARE
Problem: Adult Inpatient Plan of Care  Goal: Plan of Care Review  Outcome: Ongoing, Progressing  Goal: Patient-Specific Goal (Individualized)  Outcome: Ongoing, Progressing  Goal: Absence of Hospital-Acquired Illness or Injury  Outcome: Ongoing, Progressing  Goal: Optimal Comfort and Wellbeing  Outcome: Ongoing, Progressing  Goal: Readiness for Transition of Care  Outcome: Ongoing, Progressing     Problem: Infection  Goal: Absence of Infection Signs and Symptoms  Outcome: Ongoing, Progressing     Problem: Fluid and Electrolyte Imbalance (Acute Kidney Injury/Impairment)  Goal: Fluid and Electrolyte Balance  Outcome: Ongoing, Progressing     Problem: Oral Intake Inadequate (Acute Kidney Injury/Impairment)  Goal: Optimal Nutrition Intake  Outcome: Ongoing, Progressing     Problem: Renal Function Impairment (Acute Kidney Injury/Impairment)  Goal: Effective Renal Function  Outcome: Ongoing, Progressing     Problem: Skin Injury Risk Increased  Goal: Skin Health and Integrity  Outcome: Ongoing, Progressing     Problem: Fluid and Electrolyte Imbalance (Urinary Diversion)  Goal: Fluid and Electrolyte Balance  Outcome: Ongoing, Progressing     Problem: Infection (Urinary Diversion)  Goal: Absence of Infection Signs and Symptoms  Outcome: Ongoing, Progressing     Problem: Urine Elimination Impaired (Urinary Diversion)  Goal: Effective Urinary Elimination  Outcome: Ongoing, Progressing

## 2022-04-18 NOTE — PLAN OF CARE
04/18/22 0708   Medicare Message   Important Message from Medicare regarding Discharge Appeal Rights Signed/date by patient/caregiver   Date IMM was signed 04/16/22   Time IMM was signed 1746     IMM obtained by registration.

## 2022-04-18 NOTE — PLAN OF CARE
04/18/22 1045   Medicare Message   Important Message from Medicare regarding Discharge Appeal Rights Signed/date by patient/caregiver   Date IMM was signed 04/16/22   Time IMM was signed [id number]   Obtained per admitting

## 2022-04-18 NOTE — PLAN OF CARE
Problem: Adult Inpatient Plan of Care  Goal: Plan of Care Review  Outcome: Ongoing, Progressing  Goal: Patient-Specific Goal (Individualized)  Outcome: Ongoing, Progressing  Goal: Absence of Hospital-Acquired Illness or Injury  Outcome: Ongoing, Progressing  Goal: Optimal Comfort and Wellbeing  Outcome: Ongoing, Progressing  Goal: Readiness for Transition of Care  Outcome: Ongoing, Progressing     Problem: Infection  Goal: Absence of Infection Signs and Symptoms  Outcome: Ongoing, Progressing     Problem: Fluid and Electrolyte Imbalance (Acute Kidney Injury/Impairment)  Goal: Fluid and Electrolyte Balance  Outcome: Ongoing, Progressing     Problem: Oral Intake Inadequate (Acute Kidney Injury/Impairment)  Goal: Optimal Nutrition Intake  Outcome: Ongoing, Progressing     Problem: Renal Function Impairment (Acute Kidney Injury/Impairment)  Goal: Effective Renal Function  Outcome: Ongoing, Progressing     Problem: Skin Injury Risk Increased  Goal: Skin Health and Integrity  Outcome: Ongoing, Progressing

## 2022-04-19 LAB
ALBUMIN SERPL BCP-MCNC: 2.6 G/DL (ref 3.5–5.2)
ALP SERPL-CCNC: 82 U/L (ref 55–135)
ALT SERPL W/O P-5'-P-CCNC: 52 U/L (ref 10–44)
ANION GAP SERPL CALC-SCNC: 10 MMOL/L (ref 8–16)
AST SERPL-CCNC: 58 U/L (ref 10–40)
BASOPHILS # BLD AUTO: 0.04 K/UL (ref 0–0.2)
BASOPHILS NFR BLD: 0.3 % (ref 0–1.9)
BILIRUB SERPL-MCNC: 1.1 MG/DL (ref 0.1–1)
BUN SERPL-MCNC: 23 MG/DL (ref 8–23)
CALCIUM SERPL-MCNC: 8.9 MG/DL (ref 8.7–10.5)
CHLORIDE SERPL-SCNC: 107 MMOL/L (ref 95–110)
CO2 SERPL-SCNC: 20 MMOL/L (ref 23–29)
CREAT SERPL-MCNC: 1.1 MG/DL (ref 0.5–1.4)
DIFFERENTIAL METHOD: ABNORMAL
EOSINOPHIL # BLD AUTO: 0 K/UL (ref 0–0.5)
EOSINOPHIL NFR BLD: 0 % (ref 0–8)
ERYTHROCYTE [DISTWIDTH] IN BLOOD BY AUTOMATED COUNT: 12.8 % (ref 11.5–14.5)
EST. GFR  (AFRICAN AMERICAN): >60 ML/MIN/1.73 M^2
EST. GFR  (NON AFRICAN AMERICAN): 53.2 ML/MIN/1.73 M^2
GLUCOSE SERPL-MCNC: 131 MG/DL (ref 70–110)
HCT VFR BLD AUTO: 44.2 % (ref 37–48.5)
HGB BLD-MCNC: 15.3 G/DL (ref 12–16)
IMM GRANULOCYTES # BLD AUTO: 0.24 K/UL (ref 0–0.04)
IMM GRANULOCYTES NFR BLD AUTO: 1.9 % (ref 0–0.5)
LYMPHOCYTES # BLD AUTO: 1 K/UL (ref 1–4.8)
LYMPHOCYTES NFR BLD: 7.5 % (ref 18–48)
MCH RBC QN AUTO: 28.8 PG (ref 27–31)
MCHC RBC AUTO-ENTMCNC: 34.6 G/DL (ref 32–36)
MCV RBC AUTO: 83 FL (ref 82–98)
MONOCYTES # BLD AUTO: 0.6 K/UL (ref 0.3–1)
MONOCYTES NFR BLD: 4.4 % (ref 4–15)
NEUTROPHILS # BLD AUTO: 10.9 K/UL (ref 1.8–7.7)
NEUTROPHILS NFR BLD: 85.9 % (ref 38–73)
NRBC BLD-RTO: 0 /100 WBC
PLATELET # BLD AUTO: 166 K/UL (ref 150–450)
PMV BLD AUTO: 12.2 FL (ref 9.2–12.9)
POTASSIUM SERPL-SCNC: 3.4 MMOL/L (ref 3.5–5.1)
PROT SERPL-MCNC: 7.4 G/DL (ref 6–8.4)
RBC # BLD AUTO: 5.32 M/UL (ref 4–5.4)
SODIUM SERPL-SCNC: 137 MMOL/L (ref 136–145)
WBC # BLD AUTO: 12.7 K/UL (ref 3.9–12.7)

## 2022-04-19 PROCEDURE — 92523 SPEECH SOUND LANG COMPREHEN: CPT

## 2022-04-19 PROCEDURE — 36415 COLL VENOUS BLD VENIPUNCTURE: CPT | Performed by: INTERNAL MEDICINE

## 2022-04-19 PROCEDURE — 97166 OT EVAL MOD COMPLEX 45 MIN: CPT

## 2022-04-19 PROCEDURE — 85025 COMPLETE CBC W/AUTO DIFF WBC: CPT | Performed by: INTERNAL MEDICINE

## 2022-04-19 PROCEDURE — 97161 PT EVAL LOW COMPLEX 20 MIN: CPT

## 2022-04-19 PROCEDURE — 63600175 PHARM REV CODE 636 W HCPCS: Performed by: INTERNAL MEDICINE

## 2022-04-19 PROCEDURE — 25000003 PHARM REV CODE 250: Performed by: INTERNAL MEDICINE

## 2022-04-19 PROCEDURE — 25000003 PHARM REV CODE 250: Performed by: EMERGENCY MEDICINE

## 2022-04-19 PROCEDURE — 11000001 HC ACUTE MED/SURG PRIVATE ROOM

## 2022-04-19 PROCEDURE — 80053 COMPREHEN METABOLIC PANEL: CPT | Performed by: INTERNAL MEDICINE

## 2022-04-19 RX ORDER — ONDANSETRON 4 MG/1
4 TABLET, ORALLY DISINTEGRATING ORAL EVERY 6 HOURS PRN
Status: DISCONTINUED | OUTPATIENT
Start: 2022-04-19 | End: 2022-04-20 | Stop reason: HOSPADM

## 2022-04-19 RX ORDER — HYDROCODONE BITARTRATE AND ACETAMINOPHEN 7.5; 325 MG/1; MG/1
1 TABLET ORAL EVERY 6 HOURS PRN
Status: DISCONTINUED | OUTPATIENT
Start: 2022-04-19 | End: 2022-04-20 | Stop reason: HOSPADM

## 2022-04-19 RX ADMIN — HYDROCODONE BITARTRATE AND ACETAMINOPHEN 1 TABLET: 7.5; 325 TABLET ORAL at 09:04

## 2022-04-19 RX ADMIN — HEPARIN SODIUM 5000 UNITS: 5000 INJECTION INTRAVENOUS; SUBCUTANEOUS at 09:04

## 2022-04-19 RX ADMIN — PROMETHAZINE HYDROCHLORIDE 12.5 MG: 12.5 TABLET ORAL at 10:04

## 2022-04-19 RX ADMIN — MUPIROCIN: 20 OINTMENT TOPICAL at 09:04

## 2022-04-19 RX ADMIN — PIPERACILLIN AND TAZOBACTAM 4.5 G: 4; .5 INJECTION, POWDER, LYOPHILIZED, FOR SOLUTION INTRAVENOUS; PARENTERAL at 03:04

## 2022-04-19 RX ADMIN — HYDROCODONE BITARTRATE AND ACETAMINOPHEN 1 TABLET: 7.5; 325 TABLET ORAL at 10:04

## 2022-04-19 RX ADMIN — FOLIC ACID 1 MG: 1 TABLET ORAL at 08:04

## 2022-04-19 RX ADMIN — SODIUM CHLORIDE: 0.9 INJECTION, SOLUTION INTRAVENOUS at 08:04

## 2022-04-19 RX ADMIN — ISOSORBIDE MONONITRATE 30 MG: 30 TABLET, EXTENDED RELEASE ORAL at 08:04

## 2022-04-19 RX ADMIN — MUPIROCIN: 20 OINTMENT TOPICAL at 08:04

## 2022-04-19 RX ADMIN — HEPARIN SODIUM 5000 UNITS: 5000 INJECTION INTRAVENOUS; SUBCUTANEOUS at 08:04

## 2022-04-19 RX ADMIN — MORPHINE SULFATE 1 MG: 2 INJECTION, SOLUTION INTRAMUSCULAR; INTRAVENOUS at 01:04

## 2022-04-19 RX ADMIN — THERA TABS 1 TABLET: TAB at 08:04

## 2022-04-19 RX ADMIN — FAMOTIDINE 20 MG: 20 TABLET ORAL at 08:04

## 2022-04-19 NOTE — PT/OT/SLP EVAL
Occupational Therapy   Evaluation    Name: Zully Jerry  MRN: [id number]  Admitting Diagnosis:  SHEA (acute kidney injury)  Recent Surgery: * No surgery found *      Recommendations:     Discharge Recommendations: other (see comments) (TBD based on progress)  Discharge Equipment Recommendations:   (TBD)  Barriers to discharge:  Decreased caregiver support    Assessment:     Zully Jerry is a 62 y.o. female with a medical diagnosis of SHEA (acute kidney injury).  She presents with deficits that impact her ability to perform ADLs at [facility]. Performance deficits affecting function: weakness, impaired endurance, impaired self care skills, impaired functional mobilty, gait instability, impaired balance, decreased upper extremity function, decreased safety awareness, decreased ROM, impaired cardiopulmonary response to activity.      Rehab Prognosis: Good; patient would benefit from acute skilled OT services to address these deficits and reach maximum level of function.       Plan:     Patient to be seen 6 x/week to address the above listed problems via self-care/home management, therapeutic activities, therapeutic exercises  · Plan of Care Expires: 04/29/22  · Plan of Care Reviewed with: patient    Subjective     Chief Complaint: Weakness  Patient/Family Comments/goals: To return home at [facility]    Occupational Profile:  Living Environment: Pt lives alone in a  home.   Previous level of function: Independent  Roles and Routines: ADLs, IADLs, leisure/hobbies  Equipment Used at Home:  none  Assistance upon Discharge: Pt does not have assistance.    Pain/Comfort:  · Pain Rating 1: 0/10    Patients cultural, spiritual, Voodoo conflicts given the current situation: no    Objective:     Communicated with: nursing prior to session.  Patient found supine with blood pressure cuff, pulse ox (continuous), telemetry upon OT entry to room.    General Precautions: Standard, fall   Orthopedic Precautions:    Braces:       Occupational Performance:    Bed Mobility:    · Patient completed Rolling/Turning to Left with  stand by assistance  · Patient completed Rolling/Turning to Right with stand by assistance  · Patient completed Scooting/Bridging with stand by assistance  · Patient completed Supine to Sit with stand by assistance  · Patient completed Sit to Supine with stand by assistance    Functional Mobility/Transfers:  · Patient completed Sit <> Stand Transfer with contact guard assistance  with  hand-held assist   · Functional Mobility: sidesteps to HOB with CGA hand held assist    Activities of Daily Living:  · Feeding:  modified independence .  · Grooming: setup .  · Bathing: moderate assistance .  · Upper Body Dressing: minimum assistance .  · Lower Body Dressing: maximal assistance .  · Toileting: moderate assistance .    Cognitive/Visual Perceptual:  Cognitive/Psychosocial Skills:     -       Oriented to: Person, Place, Time and Situation   -       Follows Commands/attention:Follows two-step commands  -       Safety awareness/insight to disability: intact   -       Mood/Affect/Coping skills/emotional control: Appropriate to situation  Visual/Perceptual:      -Intact .    Physical Exam:  Upper Extremity Range of Motion:     -       Right Upper Extremity: Deficits: moderatley limited shoulder ROM  -       Left Upper Extremity: Deficits: moderatley limited shoulder ROM  Upper Extremity Strength:    -       Right Upper Extremity: Deficits: 3+/5  -       Left Upper Extremity: Deficits: 3+/5   Strength:    -       Right Upper Extremity: Deficits: 3+/5  -       Left Upper Extremity: Deficits: 3+/5  Fine Motor Coordination:    -       Intact  Gross motor coordination:   WFL    AMPAC 6 Click ADL:  AMPAC Total Score: 14    Treatment & Education:  Pt presents supine in bed and is agreeable to evaluation. Pt states she is very fatigued and is having some stomach pains. Pt reports that she does not feel ready to discharge home at  this time. Pt does not seem to be forth maximum effort into evaluation tasks. Pt would benefit from skilled OT services to address deficits in order to improve Ind and transition back to her home.   Education:    Patient left supine with all lines intact and call button in reach    GOALS:   Multidisciplinary Problems     Occupational Therapy Goals        Problem: Occupational Therapy    Goal Priority Disciplines Outcome Interventions   Occupational Therapy Goal     OT, PT/OT     Description: Goals to be met by: 4/29/2022     Patient will increase functional independence with ADLs by performing:    Feeding with Modified [place].  UE Dressing with Modified [place].  LE Dressing with Modified [place].  Grooming while EOB with Modified [place].  Toileting from toilet with Modified [place] for hygiene and clothing management.   Bathing from  edge of bed with Modified [place].  Toilet transfer to toilet with Modified [place].                     History:     Past Medical History:   Diagnosis Date    [facility]    Degenerative disc disease     cervical spine    GERD (gastroesophageal reflux disease)     Hepatitis C     Genotype 1a, no previous tx, G1S1 on Bx 2/2010    Hyperlipidemia     Hypertension     Migraine headache     Raynaud's disease        Past Surgical History:   Procedure Laterality Date    BACK SURGERY      BREAST SURGERY      lumpectomy    TUBAL LIGATION      one tube and ovary removed for cyst       Time Tracking:     OT Date of Treatment: 04/19/22  OT Start Time: 1200  OT Stop Time: 1238  OT Total Time (min): 38 min    Billable Minutes:Evaluation 38    4/19/2022

## 2022-04-19 NOTE — SUBJECTIVE & OBJECTIVE
Interval History: No acute overnight events    Review of Systems   Constitutional:  Positive for appetite change, diaphoresis, fatigue and unexpected weight change.   HENT:  Positive for dental problem and sinus pain.    Respiratory:  Positive for shortness of breath.    Gastrointestinal:  Positive for abdominal pain and diarrhea.   Endocrine: Positive for cold intolerance.   Musculoskeletal:  Positive for arthralgias and myalgias.   Skin:  Positive for color change.   Objective:     Vital Signs (Most Recent):  Temp: 97.8 °F (36.6 °C) (04/19/22 0709)  Pulse: 62 (04/19/22 0800)  Resp: (!) 34 (04/19/22 0800)  BP: (!) 191/115 (04/19/22 0800)  SpO2: 98 % (04/19/22 0800)   Vital Signs (24h Range):  Temp:  [97.8 °F (36.6 °C)-98.8 °F (37.1 °C)] 97.8 °F (36.6 °C)  Pulse:  [56-65] 62  Resp:  [18-34] 34  SpO2:  [90 %-99 %] 98 %  BP: (158-193)/() 191/115     Weight: 44.4 kg (97 lb 12.8 oz)  Body mass index is 17.89 kg/m².    Intake/Output Summary (Last 24 hours) at 4/19/2022 0939  Last data filed at 4/19/2022 0600  Gross per 24 hour   Intake 960 ml   Output 5050 ml   Net -4090 ml        Physical Exam  Constitutional:       Comments: Cachectic   HENT:      Head: Normocephalic.      Nose: Nose normal.   Eyes:      Pupils: Pupils are equal, round, and reactive to light.   Cardiovascular:      Rate and Rhythm: Tachycardia present.   Pulmonary:      Effort: Pulmonary effort is normal.      Breath sounds: No wheezing.   Abdominal:      General: Abdomen is flat.      Tenderness: There is abdominal tenderness.   Musculoskeletal:         General: Normal range of motion.      Cervical back: Normal range of motion.   Skin:     General: Skin is warm.      Comments: Positive for Raynaud's   Neurological:      General: No focal deficit present.      Mental Status: She is alert and oriented to person, place, and time.      Motor: Weakness present.   Psychiatric:         Attention and Perception: She is inattentive.         Speech:  Speech is tangential.         Behavior: Behavior is cooperative.         Cognition and Memory: Cognition is impaired.      Comments: Depressed appearing and fatigued.         Significant Labs: CBC:   Recent Labs   Lab 04/18/22  0539   WBC 13.08*   HGB 13.7   HCT 40.8   *       CMP:   Recent Labs   Lab 04/18/22  0539      K 4.5   *   CO2 18*   *   BUN 44*   CREATININE 1.9*   CALCIUM 8.9   PROT 6.7   ALBUMIN 2.4*   BILITOT 0.6   ALKPHOS 69   AST 43*   ALT 42   ANIONGAP 7*   EGFRNONAA 27.5*         Significant Imaging: I have reviewed all pertinent imaging results/findings within the past 24 hours.

## 2022-04-19 NOTE — PLAN OF CARE
04/19/22 0802   Medicare Message   Important Message from Medicare regarding Discharge Appeal Rights Given to patient/caregiver;Explained to patient/caregiver;Signed/date by patient/caregiver   Date IMM was signed 04/19/22   Time IMM was signed 0801     IMM discussed with patient.  Copy provided and documented.

## 2022-04-19 NOTE — ASSESSMENT & PLAN NOTE
Restart home medical therapy as indicated.       BP Readings from Last 3 Encounters:   04/19/22 (!) 191/115   02/16/15 140/80   02/10/15 130/80

## 2022-04-19 NOTE — NURSING
Pt transferred to Med/surg from ICU via wheelchair. . Pt tolerated well. Assessment for transfer complete. Vitals taken. Pt oriented to room and instructed to call with any needs or concerns. Will continue to monitor.

## 2022-04-19 NOTE — NURSING
Pt alert and orientated. Labs drawn. Pt ate a minimal amount of breakfast. Speech at bedside. PT at bedside. Pt walked to commode okay. Sister Daisy informed of possible discharge and picking up pt. Discharge orders pending.

## 2022-04-19 NOTE — ASSESSMENT & PLAN NOTE
Patient denies history of illicit drug use but was positive for methamphetamines and cocaine.  Will place on CIWA protocol    4/19:  More alert this morning.  Will consult PT/OT

## 2022-04-19 NOTE — PROGRESS NOTES
[facility] Medicine  Progress Note    Patient Name: Zully Jerry  MRN: [id number]  Patient Class: IP- Inpatient   Admission Date: 4/16/2022  Length of Stay: 3 days  Attending Physician: Faustino Solares Jr., MD  Primary Care Provider: Primary Doctor No        Subjective:     Principal Problem:SHEA (acute kidney injury)        HPI:  This is a 62-year-old female with a past medical history of Raynaud's phenomenon, illicit drug use, tobacco abuse, and pancreatitis who comes into our facility for nausea and vomiting.  She says the nausea and vomiting started about 36 hours ago.  She says that she does not like drinking water because it is tastes.  She says she must have vomited almost a dozen times and cannot tolerate liquids.  The pain in her abdomen started about a week ago but the nausea and vomiting started over the past has 36 hours.  She complains of a lot of abdominal pain and denies any sort of illicit drug use.  She has not used illicit drugs for several years.  The only medicines that she takes are prescribed to her legally.      Overview/Hospital Course:  4/17 CG:  CT scan of the abdomen and chest did not show any acute process.  There is no acute pancreatitis and nothing to suggest why she is having such extreme abdominal pain.  She was also positive for cocaine and methamphetamines.  She does have a pronounced acute kidney injury and she was aggressively rehydrated in the emergency department.  4/18:  renal function improved with fluids.  Patient remains lethargic and resting this morning.   4/19:  awaiting am labs.  Patient more alert.  Will ensure tolerating PO and will have PT/OT work with patient for potential DC soon.      Interval History: No acute overnight events    Review of Systems   Constitutional:  Positive for appetite change, diaphoresis, fatigue and unexpected weight change.   HENT:  Positive for dental problem and sinus pain.    Respiratory:  Positive for  shortness of breath.    Gastrointestinal:  Positive for abdominal pain and diarrhea.   Endocrine: Positive for cold intolerance.   Musculoskeletal:  Positive for arthralgias and myalgias.   Skin:  Positive for color change.   Objective:     Vital Signs (Most Recent):  Temp: 97.8 °F (36.6 °C) (04/19/22 0709)  Pulse: 62 (04/19/22 0800)  Resp: (!) 34 (04/19/22 0800)  BP: (!) 191/115 (04/19/22 0800)  SpO2: 98 % (04/19/22 0800)   Vital Signs (24h Range):  Temp:  [97.8 °F (36.6 °C)-98.8 °F (37.1 °C)] 97.8 °F (36.6 °C)  Pulse:  [56-65] 62  Resp:  [18-34] 34  SpO2:  [90 %-99 %] 98 %  BP: (158-193)/() 191/115     Weight: 44.4 kg (97 lb 12.8 oz)  Body mass index is 17.89 kg/m².    Intake/Output Summary (Last 24 hours) at 4/19/2022 0939  Last data filed at 4/19/2022 0600  Gross per 24 hour   Intake 960 ml   Output 5050 ml   Net -4090 ml        Physical Exam  Constitutional:       Comments: Cachectic   HENT:      Head: Normocephalic.      Nose: Nose normal.   Eyes:      Pupils: Pupils are equal, round, and reactive to light.   Cardiovascular:      Rate and Rhythm: Tachycardia present.   Pulmonary:      Effort: Pulmonary effort is normal.      Breath sounds: No wheezing.   Abdominal:      General: Abdomen is flat.      Tenderness: There is abdominal tenderness.   Musculoskeletal:         General: Normal range of motion.      Cervical back: Normal range of motion.   Skin:     General: Skin is warm.      Comments: Positive for Raynaud's   Neurological:      General: No focal deficit present.      Mental Status: She is alert and oriented to person, place, and time.      Motor: Weakness present.   Psychiatric:         Attention and Perception: She is inattentive.         Speech: Speech is tangential.         Behavior: Behavior is cooperative.         Cognition and Memory: Cognition is impaired.      Comments: Depressed appearing and fatigued.         Significant Labs: CBC:   Recent Labs   Lab 04/18/22  0539   WBC 13.08*   HGB  13.7   HCT 40.8   *       CMP:   Recent Labs   Lab 04/18/22  0539      K 4.5   *   CO2 18*   *   BUN 44*   CREATININE 1.9*   CALCIUM 8.9   PROT 6.7   ALBUMIN 2.4*   BILITOT 0.6   ALKPHOS 69   AST 43*   ALT 42   ANIONGAP 7*   EGFRNONAA 27.5*         Significant Imaging: I have reviewed all pertinent imaging results/findings within the past 24 hours.      Assessment/Plan:      * SHEA (acute kidney injury)  Patient with acute kidney injury likely d/t Pre-renal azotemia Which is currently improving. Labs reviewed- Renal function/electrolytes with Estimated Creatinine Clearance: 21 mL/min (A) (based on SCr of 1.9 mg/dL (H)). according to latest data. Monitor urine output and serial BMP and adjust therapy as needed. Avoid nephrotoxins and renally dose meds for GFR listed above.   - will check urine electrolytes, urine protein creatinine ratio, renal ultrasound  - will continue aggressive IV hydration.  Likely pre renal  - nephrology consulted in the emergency department    Lab Results   Component Value Date    CREATININE 1.9 (H) 04/18/2022    CREATININE 3.9 (H) 04/17/2022    CREATININE 5.8 (H) 04/16/2022          Illicit drug use  Patient denies history of illicit drug use but was positive for methamphetamines and cocaine.  Will place on CIWA protocol    4/19:  More alert this morning.  Will consult PT/OT      Hypertension  Restart home medical therapy as indicated.       BP Readings from Last 3 Encounters:   04/19/22 (!) 191/115   02/16/15 140/80   02/10/15 130/80           Bipolar affective  Will need to restart home medications        VTE Risk Mitigation (From admission, onward)         Ordered     heparin (porcine) injection 5,000 Units  Every 12 hours         04/17/22 0018     IP VTE LOW RISK PATIENT  Once         04/16/22 2016     Place NII hose  Until discontinued         04/16/22 2016                Discharge Planning   DAVID:      Code Status: Full Code   Is the patient medically ready for  discharge?:     Reason for patient still in hospital (select all that apply): Treatment  Discharge Plan A: Home with family                  Faustino Solares Jr, MD  Department of Hospital Medicine   [place] - [facility]

## 2022-04-19 NOTE — PT/OT/SLP EVAL
Physical Therapy  Evaluation    Zully Jerry   MRN: [id number]   Admitting Diagnosis: SHEA (acute kidney injury)                          Billable Minutes:  Evaluation 20    Diagnosis: SHEA (acute kidney injury)  HPI:  This is a 62-year-old female with a past medical history of Raynaud's phenomenon, illicit drug use, tobacco abuse, and pancreatitis who comes into our facility for nausea and vomiting.  She says the nausea and vomiting started about 36 hours ago.  She says that she does not like drinking water because it is tastes.  She says she must have vomited almost a dozen times and cannot tolerate liquids.  The pain in her abdomen started about a week ago but the nausea and vomiting started over the past has 36 hours.  She complains of a lot of abdominal pain and denies any sort of illicit drug use.  She has not used illicit drugs for several years.  The only medicines that she takes are prescribed to her legally. Overview/Hospital Course:  4/17 CG:  CT scan of the abdomen and chest did not show any acute process.  There is no acute pancreatitis and nothing to suggest why she is having such extreme abdominal pain.  She was also positive for cocaine and methamphetamines.    Past Medical History:   Diagnosis Date    [facility]    Degenerative disc disease     cervical spine    GERD (gastroesophageal reflux disease)     Hepatitis C     Genotype 1a, no previous tx, G1S1 on Bx 2/2010    Hyperlipidemia     Hypertension     Migraine headache     Raynaud's disease       Past Surgical History:   Procedure Laterality Date    BACK SURGERY      BREAST SURGERY      lumpectomy    TUBAL LIGATION      one tube and ovary removed for cyst       Referring physician: Sujatha  Date referred to PT: 4/19/22    General Precautions: Standard,  fall           Patient History:     DME owned (not currently used): none    Previous Level of Function:   Independent community mobility;  "reports living alone and driving    Subjective:  Communicated with nurse prior to and after session.    Pain; 10/10 abdomen   Chief Complaint: pain and weakness; does not want to eat  Patient goals: none stated           Objective:         Cognitive Exam:  Oriented to: Person and Place    Follows Commands/attention: Follows two-step commands  Communication: clear/fluent; minimal verbalization; withdrawn  Safety awareness/insight to disability: impaired    Physical Exam:  Postural examination/scapula alignment:    -       Rounded shoulders  -       Forward head    Skin integrity: Thin  Edema: None noted        Lower Extremity Range of Motion:  Right Lower Extremity: WFL  Left Lower Extremity: WFL    Lower Extremity Strength:  Right Lower Extremity: 3+/5  Left Lower Extremity: 3+/5     Fine motor coordination:     -       Intact    Gross motor coordination: WFL    Functional Mobility:  Bed Mobility:   -min assist supine to sit  -sit to supine contact guard    Transfers:   -sit to/from stand from bed min assist  -sit to/from stand from toilet SBA  -turn and back to bed and toilet contact guard  ** after toileting and standing at sink performing hand hygiene pt had episode of LOB weakness of LE"s and post lean which did appear somewhat exaggerated; therapist assist to stabilize    Gait:    -pt ambulated to/from restroom contact guard assist ~12' x 2 trials; unsteady/weak; encouraged pt to eat her lunch but she refuses    Stairs:  NA    Balance:   Static Sit: GOOD-: Takes MODERATE challenges from all directions but inconsistently  Dynamic Sit: FAIR+: Maintains balance through MINIMAL excursions of active trunk motion  Static Stand: FAIR: Maintains without assist but unable to take challenges  Dynamic stand: FAIR: Needs CONTACT GUARD during gait    Therapeutic Activities and Exercises:  Pt continent of urine; able to pull underpants up/down with SBA; performed personal hygiene SBA; Contact guard to min assist hand hygiene " as pt had LOB in standing at sink     Patient left HOB elevated with all lines intact, call button in reach and nurse notified.    Assessment:   Zully Jerry is a 62 y.o. female with a medical diagnosis of SHEA (acute kidney injury) and presents with very frail; not eating; decreased stability with transfers/gait which will likely persist as pt not eating well.    Rehab identified problem list/impairments:      Rehab potential is good.    Activity tolerance: Fair    Discharge recommendations:   24 hour supervision    Barriers to discharge:      Equipment recommendations:       GOALS:   Multidisciplinary Problems     Physical Therapy Goals        Problem: Physical Therapy    Goal Priority Disciplines Outcome Goal Variances Interventions   Physical Therapy Goal     PT, PT/OT      Description: Goals to be met by: 5/3/22    Patient will increase functional independence with mobility by performin. Supine to sit with Stand-by Assistance  2. Sit to supine with Stand-by Assistance  3. Bed to chair transfer with Stand-by Assistance using No Assistive Device  4. Gait  x 150 feet with Supervision using No Assistive Device; will add RW if pt needs.                      PLAN:    Patient to be seen 5-6 days/week   to address the above listed problems via  strengthening and progressive mobilization  Plan of Care expires:  5/3/22  Plan of Care reviewed with:  patient          2022

## 2022-04-19 NOTE — PLAN OF CARE
No issues overnight. Slept well and only woke up long enough to request pain medication for c/o abd and back pain. SB on monitor, BP trending up, afebrile, no BM, UOP 2000ml.

## 2022-04-19 NOTE — PLAN OF CARE
Problem: Adult Inpatient Plan of Care  Goal: Plan of Care Review  4/19/2022 1459 by Jazmín Calloway RN  Outcome: Ongoing, Progressing  4/19/2022 1458 by Jazmín Calloway RN  Outcome: Ongoing, Progressing  Goal: Patient-Specific Goal (Individualized)  4/19/2022 1459 by Jazmín Claloway RN  Outcome: Ongoing, Progressing  4/19/2022 1458 by Jazmín Calloway RN  Outcome: Ongoing, Progressing  Goal: Absence of Hospital-Acquired Illness or Injury  4/19/2022 1459 by Jazmín Calloway RN  Outcome: Ongoing, Progressing  4/19/2022 1458 by Jazmín Calloway RN  Outcome: Ongoing, Progressing  Goal: Optimal Comfort and Wellbeing  4/19/2022 1459 by Jazmín Calloway RN  Outcome: Ongoing, Progressing  4/19/2022 1458 by Jazmín Calloway RN  Outcome: Ongoing, Progressing  Goal: Readiness for Transition of Care  4/19/2022 1459 by Jazmín Calloway RN  Outcome: Ongoing, Progressing  4/19/2022 1458 by Jazmín Calloway RN  Outcome: Ongoing, Progressing     Problem: Infection  Goal: Absence of Infection Signs and Symptoms  4/19/2022 1459 by Jazmín Calloway RN  Outcome: Ongoing, Progressing  4/19/2022 1458 by Jazmín Calloway RN  Outcome: Ongoing, Progressing     Problem: Fluid and Electrolyte Imbalance (Acute Kidney Injury/Impairment)  Goal: Fluid and Electrolyte Balance  4/19/2022 1459 by Jazmín Calloway RN  Outcome: Ongoing, Progressing  4/19/2022 1458 by Jazmín Calloway RN  Outcome: Ongoing, Progressing     Problem: Oral Intake Inadequate (Acute Kidney Injury/Impairment)  Goal: Optimal Nutrition Intake  4/19/2022 1459 by Jazmín Calloway RN  Outcome: Ongoing, Progressing  4/19/2022 1458 by Jazmín Calloway RN  Outcome: Ongoing, Progressing     Problem: Renal Function Impairment (Acute Kidney Injury/Impairment)  Goal: Effective Renal Function  4/19/2022 1459 by Jazmín Calloway RN  Outcome: Ongoing, Progressing  4/19/2022 1458 by Jazmín Calloway RN  Outcome: Ongoing, Progressing     Problem: Skin Injury Risk Increased  Goal: Skin  Health and Integrity  4/19/2022 1459 by Jazmín Calloway RN  Outcome: Ongoing, Progressing  4/19/2022 1458 by Jazmín Calloway RN  Outcome: Ongoing, Progressing     Problem: Fluid and Electrolyte Imbalance (Urinary Diversion)  Goal: Fluid and Electrolyte Balance  4/19/2022 1459 by Jazmín Calloway RN  Outcome: Ongoing, Progressing  4/19/2022 1458 by Jazmín Calloway RN  Outcome: Ongoing, Progressing     Problem: Infection (Urinary Diversion)  Goal: Absence of Infection Signs and Symptoms  4/19/2022 1459 by Jazmín Calloway RN  Outcome: Ongoing, Progressing  4/19/2022 1458 by Jazmín Calloway RN  Outcome: Ongoing, Progressing     Problem: Urine Elimination Impaired (Urinary Diversion)  Goal: Effective Urinary Elimination  4/19/2022 1459 by Jazmín Calloway RN  Outcome: Ongoing, Progressing  4/19/2022 1458 by Jazmín Calloway RN  Outcome: Ongoing, Progressing

## 2022-04-19 NOTE — PROGRESS NOTES
Pharmacist Renal Dose Adjustment Note    Zully Jerry is a 62 y.o. female being treated with the medication Zosyn.     Patient Data:    Vital Signs (Most Recent):  Temp: 97.9 °F (36.6 °C) (04/19/22 1153)  Pulse: 62 (04/19/22 0800)  Resp: 20 (04/19/22 1056)  BP: (!) 191/115 (04/19/22 0800)  SpO2: 98 % (04/19/22 0800)   Vital Signs (72h Range):  Temp:  [97.6 °F (36.4 °C)-98.9 °F (37.2 °C)]   Pulse:  [56-85]   Resp:  [14-37]   BP: ()/()   SpO2:  [90 %-100 %]      Recent Labs   Lab 04/17/22  0342 04/18/22  0539 04/19/22  0914   CREATININE 3.9* 1.9* 1.1     Serum creatinine: 1.1 mg/dL 04/19/22 0914  Estimated creatinine clearance: 36.2 mL/min    Medication:Zosyn dose: 4.5g frequency every 12 hours will be changed to medication:Zosyn dose:4.5g frequency:every 8 hours.     Pharmacist's Name: CATIE TRIPLETT  Pharmacist's Extension: [id number]

## 2022-04-20 VITALS
WEIGHT: 97.81 LBS | OXYGEN SATURATION: 96 % | RESPIRATION RATE: 10 BRPM | SYSTOLIC BLOOD PRESSURE: 161 MMHG | HEIGHT: 62 IN | DIASTOLIC BLOOD PRESSURE: 90 MMHG | BODY MASS INDEX: 18 KG/M2 | HEART RATE: 67 BPM | TEMPERATURE: 98 F

## 2022-04-20 PROBLEM — R53.1 WEAKNESS: Status: ACTIVE | Noted: 2022-04-20

## 2022-04-20 LAB — PATHOLOGIST INTERPRETATION UPE: NORMAL

## 2022-04-20 PROCEDURE — 25000003 PHARM REV CODE 250: Performed by: EMERGENCY MEDICINE

## 2022-04-20 PROCEDURE — 63600175 PHARM REV CODE 636 W HCPCS: Performed by: INTERNAL MEDICINE

## 2022-04-20 PROCEDURE — 25000003 PHARM REV CODE 250: Performed by: INTERNAL MEDICINE

## 2022-04-20 RX ORDER — SUCRALFATE 1 G/10ML
1 SUSPENSION ORAL EVERY 6 HOURS
Status: DISCONTINUED | OUTPATIENT
Start: 2022-04-20 | End: 2022-04-20 | Stop reason: HOSPADM

## 2022-04-20 RX ORDER — SUCRALFATE 1 G/1
1 TABLET ORAL 4 TIMES DAILY
Qty: 120 TABLET | Refills: 0 | Status: SHIPPED | OUTPATIENT
Start: 2022-04-20 | End: 2022-04-28 | Stop reason: SDUPTHER

## 2022-04-20 RX ORDER — ISOSORBIDE MONONITRATE 30 MG/1
30 TABLET, EXTENDED RELEASE ORAL DAILY
Qty: 30 TABLET | Refills: 0 | Status: SHIPPED | OUTPATIENT
Start: 2022-04-21 | End: 2022-04-28 | Stop reason: SDUPTHER

## 2022-04-20 RX ADMIN — FOLIC ACID 1 MG: 1 TABLET ORAL at 08:04

## 2022-04-20 RX ADMIN — THERA TABS 1 TABLET: TAB at 08:04

## 2022-04-20 RX ADMIN — HYDROCODONE BITARTRATE AND ACETAMINOPHEN 1 TABLET: 7.5; 325 TABLET ORAL at 11:04

## 2022-04-20 RX ADMIN — PIPERACILLIN AND TAZOBACTAM 4.5 G: 4; .5 INJECTION, POWDER, LYOPHILIZED, FOR SOLUTION INTRAVENOUS; PARENTERAL at 01:04

## 2022-04-20 RX ADMIN — ISOSORBIDE MONONITRATE 30 MG: 30 TABLET, EXTENDED RELEASE ORAL at 08:04

## 2022-04-20 RX ADMIN — HYDROCODONE BITARTRATE AND ACETAMINOPHEN 1 TABLET: 7.5; 325 TABLET ORAL at 06:04

## 2022-04-20 RX ADMIN — FAMOTIDINE 20 MG: 20 TABLET ORAL at 08:04

## 2022-04-20 RX ADMIN — PIPERACILLIN AND TAZOBACTAM 4.5 G: 4; .5 INJECTION, POWDER, LYOPHILIZED, FOR SOLUTION INTRAVENOUS; PARENTERAL at 08:04

## 2022-04-20 RX ADMIN — MUPIROCIN: 20 OINTMENT TOPICAL at 09:04

## 2022-04-20 NOTE — PLAN OF CARE
[place] - Med Surg  Discharge Final Note    Primary Care Provider: Primary Doctor No    Expected Discharge Date: 4/20/2022    Final Discharge Note (most recent)     Final Note - 04/20/22 1246        Final Note    Assessment Type Final Discharge Note     Anticipated Discharge Disposition Home or Self Care        Post-Acute Status    Discharge Delays None known at this time                 Important Message from Medicare  Important Message from Medicare regarding Discharge Appeal Rights: Given to patient/caregiver, Explained to patient/caregiver, Signed/date by patient/caregiver     Date IMM was signed: 04/19/22  Time IMM was signed: 0801    Contact Info     No, Primary Doctor   Relationship: PCP - General        Next Steps: Follow up in 1 week(s)      Final note is completed. The patient will be discharging home with self-care. I offered the patient community resources, and she declined. I provided the patient with an [employer] brochure and 211 flyer for additional assistance if needed. At this time, the patient does not require any assistance from case management.

## 2022-04-20 NOTE — PLAN OF CARE
Problem: Adult Inpatient Plan of Care  Goal: Plan of Care Review  Outcome: Met  Goal: Patient-Specific Goal (Individualized)  Outcome: Met  Goal: Absence of Hospital-Acquired Illness or Injury  Outcome: Met  Goal: Optimal Comfort and Wellbeing  Outcome: Met  Goal: Readiness for Transition of Care  Outcome: Met     Problem: Infection  Goal: Absence of Infection Signs and Symptoms  Outcome: Met     Problem: Fluid and Electrolyte Imbalance (Acute Kidney Injury/Impairment)  Goal: Fluid and Electrolyte Balance  Outcome: Met     Problem: Oral Intake Inadequate (Acute Kidney Injury/Impairment)  Goal: Optimal Nutrition Intake  Outcome: Met     Problem: Renal Function Impairment (Acute Kidney Injury/Impairment)  Goal: Effective Renal Function  Outcome: Met     Problem: Skin Injury Risk Increased  Goal: Skin Health and Integrity  Outcome: Met     Problem: Fluid and Electrolyte Imbalance (Urinary Diversion)  Goal: Fluid and Electrolyte Balance  Outcome: Met     Problem: Infection (Urinary Diversion)  Goal: Absence of Infection Signs and Symptoms  Outcome: Met     Problem: Urine Elimination Impaired (Urinary Diversion)  Goal: Effective Urinary Elimination  Outcome: Met

## 2022-04-20 NOTE — DISCHARGE SUMMARY
[facility] Medicine  Discharge Summary      Patient Name: Zully Jerry  MRN: [id number]  Patient Class: IP- Inpatient  Admission Date: 4/16/2022  Hospital Length of Stay: 4 days  Discharge Date and Time:  04/20/2022 11:51 AM  Attending Physician: Faustino Solares Jr., MD   Discharging Provider: Faustino Solares Jr, MD  Primary Care Provider: Primary Doctor No      HPI:   This is a 62-year-old female with a past medical history of Raynaud's phenomenon, illicit drug use, tobacco abuse, and pancreatitis who comes into our facility for nausea and vomiting.  She says the nausea and vomiting started about 36 hours ago.  She says that she does not like drinking water because it is tastes.  She says she must have vomited almost a dozen times and cannot tolerate liquids.  The pain in her abdomen started about a week ago but the nausea and vomiting started over the past has 36 hours.  She complains of a lot of abdominal pain and denies any sort of illicit drug use.  She has not used illicit drugs for several years.  The only medicines that she takes are prescribed to her legally.      * No surgery found *      Hospital Course:   4/17 CG:  CT scan of the abdomen and chest did not show any acute process.  There is no acute pancreatitis and nothing to suggest why she is having such extreme abdominal pain.  She was also positive for cocaine and methamphetamines.  She does have a pronounced acute kidney injury and she was aggressively rehydrated in the emergency department.  4/18:  renal function improved with fluids.  Patient remains lethargic and resting this morning.   4/19:  awaiting am labs.  Patient more alert.  Will ensure tolerating PO and will have PT/OT work with patient for potential DC soon.  4/20:  Patient did have some weakness yesterday prompting further inpatient monitoring.  Blood pressure has stabilized.  Oxygen saturations in the upper 90s.  Will reassess function status and needs an  potential discharge soon.       Goals of Care Treatment Preferences:  Code Status: Full Code      Consults:     No new Assessment & Plan notes have been filed under this hospital service since the last note was generated.  Service: Hospital Medicine    Final Active Diagnoses:    Diagnosis Date Noted POA    PRINCIPAL PROBLEM:  SHEA (acute kidney injury) [N17.9] 04/17/2022 Yes    Weakness [R53.1] 04/20/2022 Unknown    Illicit drug use [F19.90] 04/17/2022 Unknown    Bipolar affective [F31.9]  Yes    Hypertension [I10]  Yes      Problems Resolved During this Admission:       Discharged Condition: good    Disposition:     Follow Up:    Patient Instructions:   No discharge procedures on file.    Significant Diagnostic Studies: Labs: All labs within the past 24 hours have been reviewed    Pending Diagnostic Studies:     None         Medications:  Reconciled Home Medications:      Medication List      ASK your doctor about these medications    acyclovir 800 MG Tab  Commonly known as: ZOVIRAX  TAKE 1 TABLET 5 TIMES DAILY FOR 7 DAYS     chlordiazepoxide 10 MG capsule  Commonly known as: LIBRIUM  Take 10 mg by mouth 2 (two) times daily as needed.     ciclopirox 8 % Soln  Commonly known as: PENLAC  Apply to affected toenails at night time DAILY. On 7th day, file nails down, clean all nails with alcohol and restart application process.     clindamycin 150 MG capsule  Commonly known as: CLEOCIN  TAKE ONE CAPSULE EVERY 6 HOURS FOR 7 DAYS     FLUoxetine 40 MG capsule  Take 40 mg by mouth once daily.     * HYDROcodone-acetaminophen 5-325 mg per tablet  Commonly known as: NORCO  Take 1 tablet by mouth every 6 (six) hours as needed.     * HYDROcodone-acetaminophen  mg per tablet  Commonly known as: NORCO  Take 1 tablet by mouth 4 (four) times daily as needed.     hydrOXYzine 50 MG tablet  Commonly known as: ATARAX  Take 50 mg by mouth every evening.     lisinopriL 10 MG tablet  Take 10 mg by mouth once daily.     meloxicam  7.5 MG tablet  Commonly known as: MOBIC     methocarbamoL 500 MG Tab  Commonly known as: ROBAXIN  Take 500 mg by mouth once daily.     omeprazole 20 MG capsule  Commonly known as: PRILOSEC     ondansetron 8 MG tablet  Commonly known as: ZOFRAN  Take 8 mg by mouth every 12 (twelve) hours as needed.     OXcarbazepine 300 MG Tab  Commonly known as: TRILEPTAL  Take 300 mg by mouth 2 (two) times daily.     pantoprazole 40 MG tablet  Commonly known as: PROTONIX  Take 1 tablet (40 mg total) by mouth once daily.     promethazine 12.5 MG Tab  Commonly known as: PHENERGAN  Take 1 tablet (12.5 mg total) by mouth every 6 (six) hours as needed (For nausea).     SUBUTEX SL  Place under the tongue.     traMADoL 100 MG Tb24  Commonly known as: ULTRAM-ER  Take 100 mg by mouth every 6 (six) hours as needed.         * This list has 2 medication(s) that are the same as other medications prescribed for you. Read the directions carefully, and ask your doctor or other care provider to review them with you.                Indwelling Lines/Drains at time of discharge:   Lines/Drains/Airways     None                 Time spent on the discharge of patient: 60 minutes         Faustino Solares Jr, MD  Department of Hospital Medicine  [facility]

## 2022-04-20 NOTE — SUBJECTIVE & OBJECTIVE
Interval History: No acute overnight events    Review of Systems   Constitutional:  Positive for appetite change, diaphoresis, fatigue and unexpected weight change.   HENT:  Positive for dental problem and sinus pain.    Respiratory:  Positive for shortness of breath.    Gastrointestinal:  Positive for abdominal pain and diarrhea.   Endocrine: Positive for cold intolerance.   Musculoskeletal:  Positive for arthralgias and myalgias.   Skin:  Positive for color change.   Objective:     Vital Signs (Most Recent):  Temp: 97.6 °F (36.4 °C) (04/20/22 0454)  Pulse: 72 (04/20/22 0454)  Resp: 18 (04/20/22 0621)  BP: 125/73 (04/20/22 0454)  SpO2: 98 % (04/20/22 0454)   Vital Signs (24h Range):  Temp:  [97.5 °F (36.4 °C)-99 °F (37.2 °C)] 97.6 °F (36.4 °C)  Pulse:  [62-72] 72  Resp:  [16-34] 18  SpO2:  [95 %-98 %] 98 %  BP: (125-191)/() 125/73     Weight: 44.4 kg (97 lb 12.8 oz)  Body mass index is 17.89 kg/m².    Intake/Output Summary (Last 24 hours) at 4/20/2022 0623  Last data filed at 4/20/2022 0600  Gross per 24 hour   Intake 637 ml   Output 1000 ml   Net -363 ml        Physical Exam  Constitutional:       Comments: Cachectic   HENT:      Head: Normocephalic.      Nose: Nose normal.   Eyes:      Pupils: Pupils are equal, round, and reactive to light.   Cardiovascular:      Rate and Rhythm: Tachycardia present.   Pulmonary:      Effort: Pulmonary effort is normal.      Breath sounds: No wheezing.   Abdominal:      General: Abdomen is flat.      Tenderness: There is abdominal tenderness.   Musculoskeletal:         General: Normal range of motion.      Cervical back: Normal range of motion.   Skin:     General: Skin is warm.      Comments: Positive for Raynaud's   Neurological:      General: No focal deficit present.      Mental Status: She is alert and oriented to person, place, and time.      Motor: Weakness present.   Psychiatric:         Attention and Perception: She is inattentive.         Speech: Speech is  tangential.         Behavior: Behavior is cooperative.         Cognition and Memory: Cognition is impaired.      Comments: Depressed appearing and fatigued.         Significant Labs: CBC:   Recent Labs   Lab 04/19/22  0914   WBC 12.70   HGB 15.3   HCT 44.2          CMP:   Recent Labs   Lab 04/19/22  0914      K 3.4*      CO2 20*   *   BUN 23   CREATININE 1.1   CALCIUM 8.9   PROT 7.4   ALBUMIN 2.6*   BILITOT 1.1*   ALKPHOS 82   AST 58*   ALT 52*   ANIONGAP 10   EGFRNONAA 53.2*         Significant Imaging: I have reviewed all pertinent imaging results/findings within the past 24 hours.

## 2022-04-20 NOTE — PLAN OF CARE
Spoke to the patient about possible rehab for substance abuse, and she politely declined. She stated that she wants to go home. Attempted to offer her community resources, and she stated that she is aware of the community resources.     Will continue to monitor.

## 2022-04-20 NOTE — PROGRESS NOTES
[facility] Medicine  Progress Note    Patient Name: Zully Jerry  MRN: [id number]  Patient Class: IP- Inpatient   Admission Date: 4/16/2022  Length of Stay: 4 days  Attending Physician: Faustino Solares Jr., MD  Primary Care Provider: Primary Doctor No        Subjective:     Principal Problem:SHEA (acute kidney injury)        HPI:  This is a 62-year-old female with a past medical history of Raynaud's phenomenon, illicit drug use, tobacco abuse, and pancreatitis who comes into our facility for nausea and vomiting.  She says the nausea and vomiting started about 36 hours ago.  She says that she does not like drinking water because it is tastes.  She says she must have vomited almost a dozen times and cannot tolerate liquids.  The pain in her abdomen started about a week ago but the nausea and vomiting started over the past has 36 hours.  She complains of a lot of abdominal pain and denies any sort of illicit drug use.  She has not used illicit drugs for several years.  The only medicines that she takes are prescribed to her legally.      Overview/Hospital Course:  4/17 CG:  CT scan of the abdomen and chest did not show any acute process.  There is no acute pancreatitis and nothing to suggest why she is having such extreme abdominal pain.  She was also positive for cocaine and methamphetamines.  She does have a pronounced acute kidney injury and she was aggressively rehydrated in the emergency department.  4/18:  renal function improved with fluids.  Patient remains lethargic and resting this morning.   4/19:  awaiting am labs.  Patient more alert.  Will ensure tolerating PO and will have PT/OT work with patient for potential DC soon.  4/20:  Patient did have some weakness yesterday prompting further inpatient monitoring.  Blood pressure has stabilized.  Oxygen saturations in the upper 90s.  Will reassess function status and needs an potential discharge soon.      Interval History: No acute  overnight events    Review of Systems   Constitutional:  Positive for appetite change, diaphoresis, fatigue and unexpected weight change.   HENT:  Positive for dental problem and sinus pain.    Respiratory:  Positive for shortness of breath.    Gastrointestinal:  Positive for abdominal pain and diarrhea.   Endocrine: Positive for cold intolerance.   Musculoskeletal:  Positive for arthralgias and myalgias.   Skin:  Positive for color change.   Objective:     Vital Signs (Most Recent):  Temp: 97.6 °F (36.4 °C) (04/20/22 0454)  Pulse: 72 (04/20/22 0454)  Resp: 18 (04/20/22 0621)  BP: 125/73 (04/20/22 0454)  SpO2: 98 % (04/20/22 0454)   Vital Signs (24h Range):  Temp:  [97.5 °F (36.4 °C)-99 °F (37.2 °C)] 97.6 °F (36.4 °C)  Pulse:  [62-72] 72  Resp:  [16-34] 18  SpO2:  [95 %-98 %] 98 %  BP: (125-191)/() 125/73     Weight: 44.4 kg (97 lb 12.8 oz)  Body mass index is 17.89 kg/m².    Intake/Output Summary (Last 24 hours) at 4/20/2022 0623  Last data filed at 4/20/2022 0600  Gross per 24 hour   Intake 637 ml   Output 1000 ml   Net -363 ml        Physical Exam  Constitutional:       Comments: Cachectic   HENT:      Head: Normocephalic.      Nose: Nose normal.   Eyes:      Pupils: Pupils are equal, round, and reactive to light.   Cardiovascular:      Rate and Rhythm: Tachycardia present.   Pulmonary:      Effort: Pulmonary effort is normal.      Breath sounds: No wheezing.   Abdominal:      General: Abdomen is flat.      Tenderness: There is abdominal tenderness.   Musculoskeletal:         General: Normal range of motion.      Cervical back: Normal range of motion.   Skin:     General: Skin is warm.      Comments: Positive for Raynaud's   Neurological:      General: No focal deficit present.      Mental Status: She is alert and oriented to person, place, and time.      Motor: Weakness present.   Psychiatric:         Attention and Perception: She is inattentive.         Speech: Speech is tangential.         Behavior:  Behavior is cooperative.         Cognition and Memory: Cognition is impaired.      Comments: Depressed appearing and fatigued.         Significant Labs: CBC:   Recent Labs   Lab 04/19/22  0914   WBC 12.70   HGB 15.3   HCT 44.2          CMP:   Recent Labs   Lab 04/19/22  0914      K 3.4*      CO2 20*   *   BUN 23   CREATININE 1.1   CALCIUM 8.9   PROT 7.4   ALBUMIN 2.6*   BILITOT 1.1*   ALKPHOS 82   AST 58*   ALT 52*   ANIONGAP 10   EGFRNONAA 53.2*         Significant Imaging: I have reviewed all pertinent imaging results/findings within the past 24 hours.      Assessment/Plan:      * SHEA (acute kidney injury)  Patient with acute kidney injury likely d/t Pre-renal azotemia Which is currently improving. Labs reviewed- Renal function/electrolytes with Estimated Creatinine Clearance: 36.2 mL/min (based on SCr of 1.1 mg/dL). according to latest data. Monitor urine output and serial BMP and adjust therapy as needed. Avoid nephrotoxins and renally dose meds for GFR listed above.   - will check urine electrolytes, urine protein creatinine ratio, renal ultrasound  - will continue aggressive IV hydration.  Likely pre renal  - nephrology consulted in the emergency department    Lab Results   Component Value Date    CREATININE 1.1 04/19/2022    CREATININE 1.9 (H) 04/18/2022    CREATININE 3.9 (H) 04/17/2022          Weakness  Continue physical and occupational therapy.      Illicit drug use  Patient denies history of illicit drug use but was positive for methamphetamines and cocaine.  Will place on CIWA protocol    4/19:  More alert this morning.  Will consult PT/OT      Hypertension  Restart home medical therapy as indicated.       BP Readings from Last 3 Encounters:   04/20/22 125/73   02/16/15 140/80   02/10/15 130/80           Bipolar affective  Will need to restart home medications        VTE Risk Mitigation (From admission, onward)         Ordered     heparin (porcine) injection 5,000 Units  Every  12 hours         04/17/22 0018     IP VTE LOW RISK PATIENT  Once         04/16/22 2016     Place NII hose  Until discontinued         04/16/22 2016                Discharge Planning   DAVID:      Code Status: Full Code   Is the patient medically ready for discharge?:     Reason for patient still in hospital (select all that apply): Treatment  Discharge Plan A: Home with family                  Faustino Solares Jr, MD  Department of Hospital Medicine   [facility]

## 2022-04-20 NOTE — ASSESSMENT & PLAN NOTE
Restart home medical therapy as indicated.       BP Readings from Last 3 Encounters:   04/20/22 125/73   02/16/15 140/80   02/10/15 130/80

## 2022-04-20 NOTE — PT/OT/SLP EVAL
Speech Language Pathology Evaluation  Cognitive Communication    Patient Name:  Zully Jerry   MRN:  [id number]  Admitting Diagnosis: SHEA (acute kidney injury)    Recommendations:     Recommendations:                General Recommendations:  supervision for safety   Diet recommendations:  Regular, Thin   Aspiration Precautions: Assistance with meals   General Precautions: Standard, fall  Communication strategies:  Expresses needs     History:     Past Medical History:   Diagnosis Date    Bipolar affective     [facility]    Degenerative disc disease     cervical spine    GERD (gastroesophageal reflux disease)     Hepatitis C     Genotype 1a, no previous tx, G1S1 on Bx 2/2010    Hyperlipidemia     Hypertension     Migraine headache     Raynaud's disease        Past Surgical History:   Procedure Laterality Date    BACK SURGERY      BREAST SURGERY      lumpectomy    TUBAL LIGATION      one tube and ovary removed for cyst       Social History: Patient states lives with alone; near brother.    Prior Intubation HX:  N/A     Modified Barium Swallow: N/A     Chest X-Rays: See imaging     Prior diet: Regular     Occupation/hobbies/homemaking:       Subjective       Patient goals: To geel better      Pain/Comfort:  · Pain Rating 1: 5/10  · Location - Side 1: Bilateral  · Location - Orientation 1: generalized  · Location 1: other (see comments) (all over)  · Pain Addressed 1: Reposition, Nurse notified    Respiratory Status: Room air    Objective:   Cognitive Status:    Orientation Person, Place and Time      Receptive Language:   Comprehension:      WFL    Pragmatics:    monotone  flat affect     Expressive Language:  Verbal:    Conversational speech WFLs   Nonverbal:           Motor Speech:  WFL    Voice:   WFL    Visual-Spatial:  WFL      Treatment: SLP services not warranted at this time     Assessment:   Zully Jerry is a 62 y.o. female with an SLP diagnosis of weakness as a result of  medical condition .  She presents with functional speech/cognitive/swallowing skills.    Goals:   Multidisciplinary Problems     SLP Goals     Not on file                Plan:   · Patient to be seen:      · Plan of Care expires:     · Plan of Care reviewed with:  patient   · SLP Follow-Up:   (Speech/ cognition/ swallowing WFLs)       Discharge recommendations: Home with supervision for safety      Barriers to Discharge:  Safety Awareness and decreased awareness of deficits requiring supervision    Time Tracking:   SLP Treatment Date:   04/19/22  Speech Start Time:  1130  Speech Stop Time:  1200     Speech Total Time (min):  30 min    Billable Minutes: Eval 30     04/20/2022

## 2022-04-20 NOTE — PHYSICIAN QUERY
PT Name: Zully Jerry  MR #: [id number]     DOCUMENTATION CLARIFICATION     CDS/: Selma Anthony RN              Contact information: [email or web address]  This form is a permanent document in the medical record.     Query Date: April 20, 2022    By submitting this query, we are merely seeking further clarification of documentation.  Please utilize your independent clinical judgment when addressing the question(s) below.  The Medical Record contains the following:  Indicators Supporting Clinical Findings Location in Medical Record   x HR    RR     BP   Temp BP= 75/61, 80/50, 77/50, 85/49  HR= 107, 94, 96  RR= 22, 22, 35, 36, 37  Temp= 97.8  Temp= 99   4/16 Flowsheet        4/19 Flowsheet   x Lactic Acid          Procalcitonin Lactic Acid= 2.4 4/116 Lab   x WBC       Bands     CRP  WBC= 37.28, 18.45, 13.08 4/16-18 Lab     x Culture(s) No growth  No growth 4/16 Urine culture  4/16 Blood culture     x AMS, Confusion, LOC, etc. She is disoriented 4/17 Hosp Med MD PN     x Organ Dysfunction/Failure Acute Kidney Injury 4/16 HP     x Bacteremia or Sepsis / Septic Sepsis: Empirically started on Zosyn.   Blood and urine cultures sent   4/16 HP    Known or Suspected Source of Infection documented      (Failed) Outpatient Treatment     x Medication NS 1000ml Bolus x 3 doses 4/16  Piperacillin IVPB 4/16- 19 4/16-19 MAR       x Treatment Blood culture  Urine culture  CXR    x Other Mild dependent atelectatic change in the lower lobes    emergency department with multiple episodes of pancreatitis over the past few days.  Complaining of epigastric pain as well as some nausea and vomiting.  On and off for the past few weeks      Her white count is elevated, but she is afebrile, questionable stress reaction     CT scan of the abdomen and chest did not show any acute process.    There is no acute pancreatitis and nothing to suggest why she is having such extreme abdominal pain 4/16 CT chest    4/16 ED MD  PN            4/17 Hosp Med MD PN          Provider, please specify diagnosis or diagnoses associated with above clinical findings.    [   ] Sepsis is ruled in     [   ] Severe Sepsis with Acute Organ Dysfunction/Failure:  SHEA     [x  ]  SIRS without infectious etiology     [   ] Sepsis ruled out     [   ] Other Infectious Disease (please specify): __________       [  ] Clinically Undetermined          Please document in your progress notes daily for the duration of treatment until resolved and include in your discharge summary.

## 2022-04-20 NOTE — ASSESSMENT & PLAN NOTE
Patient with acute kidney injury likely d/t Pre-renal azotemia Which is currently improving. Labs reviewed- Renal function/electrolytes with Estimated Creatinine Clearance: 36.2 mL/min (based on SCr of 1.1 mg/dL). according to latest data. Monitor urine output and serial BMP and adjust therapy as needed. Avoid nephrotoxins and renally dose meds for GFR listed above.   - will check urine electrolytes, urine protein creatinine ratio, renal ultrasound  - will continue aggressive IV hydration.  Likely pre renal  - nephrology consulted in the emergency department    Lab Results   Component Value Date    CREATININE 1.1 04/19/2022    CREATININE 1.9 (H) 04/18/2022    CREATININE 3.9 (H) 04/17/2022

## 2022-04-21 LAB
BACTERIA BLD CULT: NORMAL
BACTERIA BLD CULT: NORMAL

## 2022-04-21 NOTE — SUBJECTIVE & OBJECTIVE
Past Medical History:   Diagnosis Date    Bipolar affective     [facility]    Degenerative disc disease     cervical spine    GERD (gastroesophageal reflux disease)     Hepatitis C     Genotype 1a, no previous tx, G1S1 on Bx 2/2010    Hyperlipidemia     Hypertension     Migraine headache     Raynaud's disease        Past Surgical History:   Procedure Laterality Date    BACK SURGERY      BREAST SURGERY      lumpectomy    TUBAL LIGATION      one tube and ovary removed for cyst       Review of patient's allergies indicates:  No Known Allergies    No current facility-administered medications on file prior to encounter.     Current Outpatient Medications on File Prior to Encounter   Medication Sig    acyclovir (ZOVIRAX) 800 MG Tab TAKE 1 TABLET 5 TIMES DAILY FOR 7 DAYS    chlordiazepoxide (LIBRIUM) 10 MG capsule Take 10 mg by mouth 2 (two) times daily as needed.     ciclopirox (PENLAC) 8 % Soln Apply to affected toenails at night time DAILY. On 7th day, file nails down, clean all nails with alcohol and restart application process.    clindamycin (CLEOCIN) 150 MG capsule TAKE ONE CAPSULE EVERY 6 HOURS FOR 7 DAYS    fluoxetine (PROZAC) 40 MG capsule Take 40 mg by mouth once daily.    hydrocodone-acetaminophen 10-325mg (NORCO)  mg Tab Take 1 tablet by mouth 4 (four) times daily as needed.    hydrocodone-acetaminophen 5-325mg (NORCO) 5-325 mg per tablet Take 1 tablet by mouth every 6 (six) hours as needed.    hydrOXYzine (ATARAX) 50 MG tablet Take 50 mg by mouth every evening.    lisinopril 10 MG tablet Take 10 mg by mouth once daily.    meloxicam (MOBIC) 7.5 MG tablet     methocarbamol (ROBAXIN) 500 MG Tab Take 500 mg by mouth once daily.    omeprazole (PRILOSEC) 20 MG capsule     ondansetron (ZOFRAN) 8 MG tablet Take 8 mg by mouth every 12 (twelve) hours as needed.    oxcarbazepine (TRILEPTAL) 300 MG Tab Take 300 mg by mouth 2 (two) times daily.    pantoprazole (PROTONIX) 40 MG tablet Take 1  tablet (40 mg total) by mouth once daily.    promethazine (PHENERGAN) 12.5 MG Tab Take 1 tablet (12.5 mg total) by mouth every 6 (six) hours as needed (For nausea).    tramadol (ULTRAM-ER) 100 MG Tb24 Take 100 mg by mouth every 6 (six) hours as needed.     Family History       Problem Relation (Age of Onset)    Cancer Sister, Paternal Grandmother    Diabetes Mother, Father    Heart disease Mother, Father    Hyperlipidemia Mother    Kidney disease Mother          Tobacco Use    Smoking status: Former Smoker     Packs/day: 0.25     Types: Cigarettes     Quit date: 10/16/2014     Years since quittin.5    Smokeless tobacco: Never Used   Substance and Sexual Activity    Alcohol use: No     Comment: quit ~10-12 yrs ago; previously heavy use    Drug use: No    Sexual activity: Yes     Partners: Male     Birth control/protection: Post-menopausal     Review of Systems  Objective:     Vital Signs (Most Recent):  Temp: 97.8 °F (36.6 °C) (22 1124)  Pulse: 67 (22 1400)  Resp: 10 (22 1151)  BP: (!) 161/90 (22 1124)  SpO2: 96 % (22 1124)   Vital Signs (24h Range):  Temp:  [97.8 °F (36.6 °C)] 97.8 °F (36.6 °C)  Pulse:  [67-69] 67  Resp:  [10-20] 10  SpO2:  [96 %] 96 %  BP: (161)/(90) 161/90     Weight: 44.4 kg (97 lb 12.8 oz)  Body mass index is 17.89 kg/m².    Physical Exam        Significant Labs: {Results:85265}    Significant Imaging: {Imaging Review:33322}

## 2022-04-24 VITALS
BODY MASS INDEX: 18.94 KG/M2 | HEIGHT: 62 IN | SYSTOLIC BLOOD PRESSURE: 168 MMHG | DIASTOLIC BLOOD PRESSURE: 110 MMHG | OXYGEN SATURATION: 98 % | WEIGHT: 102.94 LBS

## 2022-12-17 ENCOUNTER — HOSPITAL ENCOUNTER (EMERGENCY)
Facility: HOSPITAL | Age: 62
Discharge: HOME | End: 2022-12-17
Attending: EMERGENCY MEDICINE
Payer: MEDICARE

## 2022-12-17 VITALS
SYSTOLIC BLOOD PRESSURE: 148 MMHG | OXYGEN SATURATION: 98 % | BODY MASS INDEX: 17.08 KG/M2 | DIASTOLIC BLOOD PRESSURE: 73 MMHG | HEART RATE: 70 BPM | RESPIRATION RATE: 16 BRPM | TEMPERATURE: 98 F | WEIGHT: 93.38 LBS

## 2022-12-17 DIAGNOSIS — S09.90XA CLOSED HEAD INJURY, INITIAL ENCOUNTER: Primary | ICD-10-CM

## 2022-12-17 PROCEDURE — 99284 EMERGENCY DEPT VISIT MOD MDM: CPT | Mod: 25

## 2022-12-17 RX ORDER — ACETAMINOPHEN 500 MG
1000 TABLET ORAL EVERY 8 HOURS PRN
Qty: 30 TABLET | Refills: 0 | Status: SHIPPED | OUTPATIENT
Start: 2022-12-17

## 2022-12-17 RX ORDER — ONDANSETRON 4 MG/1
4 TABLET, ORALLY DISINTEGRATING ORAL EVERY 8 HOURS PRN
Qty: 8 TABLET | Refills: 0 | Status: SHIPPED | OUTPATIENT
Start: 2022-12-17

## 2022-12-17 RX ORDER — NAPROXEN 500 MG/1
500 TABLET ORAL EVERY 12 HOURS PRN
Qty: 20 TABLET | Refills: 0 | Status: SHIPPED | OUTPATIENT
Start: 2022-12-17

## 2022-12-17 NOTE — Clinical Note
"Zully Norman" Rosalino was seen and treated in our emergency department on 12/17/2022.  She may return to work on 12/19/2022.       If you have any questions or concerns, please don't hesitate to call.       RN    "
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
## 2023-01-16 ENCOUNTER — HOSPITAL ENCOUNTER (EMERGENCY)
Facility: HOSPITAL | Age: 63
Discharge: HOME | End: 2023-01-16
Attending: EMERGENCY MEDICINE
Payer: MEDICARE

## 2023-01-16 VITALS
TEMPERATURE: 98 F | OXYGEN SATURATION: 95 % | HEART RATE: 87 BPM | SYSTOLIC BLOOD PRESSURE: 132 MMHG | HEIGHT: 62 IN | DIASTOLIC BLOOD PRESSURE: 98 MMHG | WEIGHT: 88 LBS | BODY MASS INDEX: 16.2 KG/M2 | RESPIRATION RATE: 18 BRPM

## 2023-01-16 DIAGNOSIS — K52.9 GASTROENTERITIS: ICD-10-CM

## 2023-01-16 DIAGNOSIS — R11.2 NAUSEA AND VOMITING, UNSPECIFIED VOMITING TYPE: Primary | ICD-10-CM

## 2023-01-16 PROCEDURE — 99284 EMERGENCY DEPT VISIT MOD MDM: CPT

## 2023-01-16 PROCEDURE — 25000003 PHARM REV CODE 250: Performed by: EMERGENCY MEDICINE

## 2023-01-16 RX ORDER — BUPRENORPHINE HYDROCHLORIDE 8 MG/1
8 TABLET SUBLINGUAL 3 TIMES DAILY
COMMUNITY
Start: 2023-01-10

## 2023-01-16 RX ORDER — DULOXETIN HYDROCHLORIDE 60 MG/1
60 CAPSULE, DELAYED RELEASE ORAL
COMMUNITY

## 2023-01-16 RX ORDER — ARIPIPRAZOLE 5 MG/1
5 TABLET ORAL NIGHTLY
COMMUNITY
Start: 2023-01-10

## 2023-01-16 RX ORDER — IBUPROFEN 600 MG/1
600 TABLET ORAL
Status: COMPLETED | OUTPATIENT
Start: 2023-01-16 | End: 2023-01-16

## 2023-01-16 RX ORDER — HYDROXYZINE PAMOATE 50 MG/1
50 CAPSULE ORAL 3 TIMES DAILY PRN
COMMUNITY

## 2023-01-16 RX ORDER — MAG HYDROX/ALUMINUM HYD/SIMETH 200-200-20
30 SUSPENSION, ORAL (FINAL DOSE FORM) ORAL ONCE
Status: COMPLETED | OUTPATIENT
Start: 2023-01-16 | End: 2023-01-16

## 2023-01-16 RX ORDER — ALPRAZOLAM 0.25 MG/1
0.25 TABLET ORAL 3 TIMES DAILY
COMMUNITY

## 2023-01-16 RX ORDER — ONDANSETRON 4 MG/1
4 TABLET, ORALLY DISINTEGRATING ORAL EVERY 8 HOURS PRN
Qty: 20 TABLET | Refills: 0 | Status: SHIPPED | OUTPATIENT
Start: 2023-01-16

## 2023-01-16 RX ORDER — ONDANSETRON 4 MG/1
4 TABLET, ORALLY DISINTEGRATING ORAL
Status: COMPLETED | OUTPATIENT
Start: 2023-01-16 | End: 2023-01-16

## 2023-01-16 RX ORDER — METFORMIN HYDROCHLORIDE 500 MG/1
500 TABLET ORAL 2 TIMES DAILY WITH MEALS
COMMUNITY

## 2023-01-16 RX ORDER — SERTRALINE HYDROCHLORIDE 100 MG/1
100 TABLET, FILM COATED ORAL
COMMUNITY
Start: 2023-01-10

## 2023-01-16 RX ORDER — TRAZODONE HYDROCHLORIDE 50 MG/1
50 TABLET ORAL NIGHTLY
COMMUNITY
Start: 2023-01-10

## 2023-01-16 RX ORDER — LIDOCAINE HYDROCHLORIDE 20 MG/ML
15 SOLUTION OROPHARYNGEAL ONCE
Status: COMPLETED | OUTPATIENT
Start: 2023-01-16 | End: 2023-01-16

## 2023-01-16 RX ORDER — LURASIDONE HYDROCHLORIDE 40 MG/1
40 TABLET, FILM COATED ORAL
COMMUNITY

## 2023-01-16 RX ORDER — DICYCLOMINE HYDROCHLORIDE 20 MG/1
20 TABLET ORAL 3 TIMES DAILY PRN
Qty: 20 TABLET | Refills: 0 | Status: SHIPPED | OUTPATIENT
Start: 2023-01-16 | End: 2023-02-15

## 2023-01-16 RX ORDER — MECLIZINE HCL 25MG 25 MG/1
25 TABLET, CHEWABLE ORAL 2 TIMES DAILY
COMMUNITY

## 2023-01-16 RX ORDER — BUPRENORPHINE HYDROCHLORIDE AND NALOXONE HYDROCHLORIDE DIHYDRATE 2; .5 MG/1; MG/1
1 TABLET SUBLINGUAL 2 TIMES DAILY
COMMUNITY

## 2023-01-16 RX ORDER — LEVOTHYROXINE SODIUM 25 UG/1
25 TABLET ORAL
COMMUNITY
Start: 2023-01-10

## 2023-01-16 RX ADMIN — LIDOCAINE HYDROCHLORIDE 15 ML: 20 SOLUTION ORAL at 01:01

## 2023-01-16 RX ADMIN — ONDANSETRON 4 MG: 4 TABLET, ORALLY DISINTEGRATING ORAL at 01:01

## 2023-01-16 RX ADMIN — IBUPROFEN 600 MG: 600 TABLET ORAL at 01:01

## 2023-01-16 RX ADMIN — ALUMINUM HYDROXIDE, MAGNESIUM HYDROXIDE, AND SIMETHICONE 30 ML: 200; 200; 20 SUSPENSION ORAL at 01:01

## 2023-01-16 NOTE — ED PROVIDER NOTES
"Encounter Date: 2023       History     Chief Complaint   Patient presents with    Nausea     Pt presents to the ER w/ complaints of nausea, abdominal cramping, and headache x 3 days. Pt states she "thinks she has a stomach bug," recently had URI but denies any  lingering symptoms.     62-year-old female comes in complaining of nausea, vomiting, generalized diffuse abdominal cramping but no focal or severe abdominal pain associated with diarrhea and generalized headache.  No sudden onset of headache.  No fever.  No meningismus.  No numbness or weakness or speech difficulty or vision changes or confusion or vomiting.  She reports that she thinks that she has a stomach bug.  Symptoms have been ongoing since Friday.  She has had to miss work because of this but unfortunately has not been able to get to the doctor until now.    Review of patient's allergies indicates:  No Known Allergies  Past Medical History:   Diagnosis Date    [facility]    Degenerative disc disease     cervical spine    GERD (gastroesophageal reflux disease)     Hepatitis C     Genotype 1a, no previous tx, G1S1 on Bx 2010    Hyperlipidemia     Hypertension     Migraine headache     Raynaud's disease      Past Surgical History:   Procedure Laterality Date    BACK SURGERY      BREAST SURGERY      lumpectomy    TUBAL LIGATION      one tube and ovary removed for cyst     Family History   Problem Relation Age of Onset    Hyperlipidemia Mother     Diabetes Mother     Kidney disease Mother     Heart disease Mother     Heart disease Father     Diabetes Father     Cancer Sister         not sure  but knows its female Ca.    Cancer Paternal Grandmother         uterine Cancer     Social History     Tobacco Use    Smoking status: Former     Packs/day: 0.25     Types: Cigarettes     Quit date: 10/16/2014     Years since quittin.2    Smokeless tobacco: Never   Substance Use Topics    Alcohol use: No     Comment: " quit ~10-12 yrs ago; previously heavy use    Drug use: No     Review of Systems   Constitutional:  Negative for fever.   HENT:  Negative for sore throat.    Eyes:  Negative for visual disturbance.   Respiratory:  Negative for shortness of breath.    Cardiovascular:  Negative for chest pain.   Gastrointestinal:  Positive for abdominal pain, diarrhea, nausea and vomiting.   Genitourinary:  Negative for dysuria.   Musculoskeletal:  Negative for back pain.   Skin:  Negative for rash.   Neurological:  Positive for headaches. Negative for dizziness, seizures, syncope, facial asymmetry, speech difficulty, weakness and numbness.   Hematological:  Does not bruise/bleed easily.   Psychiatric/Behavioral:  Negative for confusion.    All other systems reviewed and are negative.    Physical Exam     Initial Vitals [01/16/23 0123]   BP Pulse Resp Temp SpO2   (!) 132/98 87 18 98 °F (36.7 °C) 95 %      MAP       --         Physical Exam    Nursing note and vitals reviewed.  Constitutional: She appears well-developed and well-nourished. She is not diaphoretic. No distress.   HENT:   Head: Normocephalic and atraumatic.   Eyes: EOM are normal. Pupils are equal, round, and reactive to light.   Neck: Neck supple.   Normal range of motion.  Cardiovascular:  Normal rate and regular rhythm.           Pulmonary/Chest: Breath sounds normal. She has no wheezes.   Abdominal: Abdomen is soft. Bowel sounds are normal. She exhibits no distension. There is no abdominal tenderness. There is no rebound.   Musculoskeletal:         General: No tenderness or edema. Normal range of motion.      Cervical back: Normal range of motion and neck supple.     Neurological: She is alert and oriented to person, place, and time. She has normal strength. No cranial nerve deficit or sensory deficit.   Skin: Skin is warm and dry. Capillary refill takes less than 2 seconds.   Psychiatric: She has a normal mood and affect. Thought content normal.       ED Course    Procedures  Labs Reviewed - No data to display       Imaging Results    None          Medications   ondansetron disintegrating tablet 4 mg (4 mg Oral Given 1/16/23 0132)   ibuprofen tablet 600 mg (600 mg Oral Given 1/16/23 0132)   aluminum-magnesium hydroxide-simethicone 200-200-20 mg/5 mL suspension 30 mL (30 mLs Oral Given 1/16/23 0133)     And   LIDOcaine HCl 2% oral solution 15 mL (15 mLs Oral Given 1/16/23 0132)        Additional MDM:   Differential Diagnosis:   Symptom: Abdominal pain. <> The follow diagnoses were considered and will be evaluated: Cholecystitis, Gastroenteritis and Pancreatitis.           Attending Attestation:             Attending ED Notes:   62-year-old female comes in complaining of nausea, vomiting, generalized diffuse abdominal cramping but no focal or severe abdominal pain associated with diarrhea and generalized headache.  No sudden onset of headache.  No fever.  No meningismus.  No numbness or weakness or speech difficulty or vision changes or confusion or vomiting.  She reports that she thinks that she has a stomach bug.  Symptoms have been ongoing since Friday.  She has had to miss work because of this but unfortunately has not been able to get to the doctor until now.    Presentation consistent with viral gastroenteritis.  Ibuprofen Zofran GI cocktail given with improvement of symptoms.  Abdomen is soft nontender nondistended.  Patient is non-toxic appearing, in no acute distress, and vital signs are stable and normal upon discharge. Upon completion of ED evaluation and management, with consideration of thorough differential diagnosis, the patient was found to have no acutely abnormal physical exam findings or other pathology requiring further emergent intervention or admission at this time. Patient/caregiver has no complaints upon discharge and verbalizes understanding and agreement with diagnosis and treatment plan. Discharge instructions with return precautions provided.  Patient/caregiver verbalizes understanding to return to ED immediately for any new or worsening symptoms and to follow up with PCP/specialist recommended in 1-2 days.                          Clinical Impression:   Final diagnoses:  [R11.2] Nausea and vomiting, unspecified vomiting type (Primary)  [K52.9] Gastroenteritis        ED Disposition Condition    Discharge Stable          ED Prescriptions       Medication Sig Dispense Start Date End Date Auth. Provider    dicyclomine (BENTYL) 20 mg tablet Take 1 tablet (20 mg total) by mouth 3 (three) times daily as needed (abdominal cramping). 20 tablet 1/16/2023 2/15/2023 Margo Pearl MD    ondansetron (ZOFRAN-ODT) 4 MG TbDL Take 1 tablet (4 mg total) by mouth every 8 (eight) hours as needed (Nausea/Vomiting). 20 tablet 1/16/2023 -- Margo Pearl MD          Follow-up Information       Follow up With Specialties Details Why Contact Info Additional Information    [facility] Psychology, Internal Medicine, Gynecology, Dental General Practice Schedule an appointment as soon as possible for a visit   [facility] [place] [id number]  [phone]       [facility] - Emergency Department Emergency Medicine Go to  As needed, If symptoms worsen [address] [place] [id number] [phone] Floor [address]             Margo Pearl MD  01/16/23 8328

## 2023-01-16 NOTE — Clinical Note
"Zully"Vernon Jerry was seen and treated in our emergency department on 1/16/2023.  She may return to work on 01/19/2023.  To whom it may concern,  Please allow this to be a formal work excuse valid from Friday January 13th through Wednesday January 18th. Ms. Jerry has had contagious viral gastroenteritis; nausea vomiting abdominal pain diarrhea.  Thank you,  Margo Pearl MD     If you have any questions or concerns, please don't hesitate to call.      Margo Pearl MD"

## 2023-05-16 ENCOUNTER — OFFICE VISIT (OUTPATIENT)
Dept: URGENT CARE | Facility: CLINIC | Age: 63
End: 2023-05-16
Payer: MEDICARE

## 2023-05-16 VITALS
HEIGHT: 62 IN | DIASTOLIC BLOOD PRESSURE: 85 MMHG | SYSTOLIC BLOOD PRESSURE: 134 MMHG | BODY MASS INDEX: 17.3 KG/M2 | OXYGEN SATURATION: 98 % | TEMPERATURE: 98 F | RESPIRATION RATE: 18 BRPM | HEART RATE: 103 BPM | WEIGHT: 94 LBS

## 2023-05-16 DIAGNOSIS — B00.2 HERPES VIRUS INFECTION OF ORAL MUCOSA: Primary | ICD-10-CM

## 2023-05-16 PROCEDURE — 99213 OFFICE O/P EST LOW 20 MIN: CPT | Mod: ,,, | Performed by: FAMILY MEDICINE

## 2023-05-16 PROCEDURE — 99213 PR OFFICE/OUTPT VISIT, EST, LEVL III, 20-29 MIN: ICD-10-PCS | Mod: ,,, | Performed by: FAMILY MEDICINE

## 2023-05-16 RX ORDER — HYDROCODONE BITARTRATE AND ACETAMINOPHEN 7.5; 325 MG/1; MG/1
1 TABLET ORAL EVERY 6 HOURS PRN
Qty: 20 TABLET | Refills: 0 | Status: SHIPPED | OUTPATIENT
Start: 2023-05-16 | End: 2023-05-21

## 2023-05-16 RX ORDER — LIDOCAINE HYDROCHLORIDE 20 MG/ML
SOLUTION OROPHARYNGEAL
Qty: 100 ML | Refills: 1 | Status: SHIPPED | OUTPATIENT
Start: 2023-05-16

## 2023-05-16 RX ORDER — HYDROCODONE BITARTRATE AND ACETAMINOPHEN 5; 325 MG/1; MG/1
1 TABLET ORAL EVERY 6 HOURS PRN
Qty: 20 TABLET | Refills: 0 | Status: SHIPPED | OUTPATIENT
Start: 2023-05-16 | End: 2023-05-16 | Stop reason: CLARIF

## 2023-05-16 RX ORDER — VALACYCLOVIR HYDROCHLORIDE 1 G/1
TABLET, FILM COATED ORAL
Qty: 42 TABLET | Refills: 5 | Status: SHIPPED | OUTPATIENT
Start: 2023-05-16

## 2023-05-16 NOTE — PROGRESS NOTES
"Subjective:      Patient ID: Zully Jerry is a 63 y.o. female.    Vitals:  height is 5' 2" (1.575 m) and weight is 42.6 kg (94 lb). Her temperature is 98.4 °F (36.9 °C). Her blood pressure is 134/85 and her pulse is 103. Her respiration is 18 and oxygen saturation is 98%.     Chief Complaint: Mouth Lesions and Sinus Problem     Patient is a 63 y.o.  female who presents to urgent care with complaints of swollen glands, ulcers in mouth, sinus pressure under eyes, and sore throat, bilateral earache.  Symptoms began 10 days ago.  States she 1st started with fever and sore throat and then the lesions popped out in the mouth.  Patient states this is an ongoing issue with the lesions in the mouth.  States they typically appear whenever she is stressed out or gets sick.  States she is never been formally diagnosed with anything.  States the lesions are so painful that she is been drinking ensure only because it is too painful to eat.  Patient states she has been under lot of stress recently as well.    Constitution: Negative.   HENT:  Positive for mouth sores and sore throat.    Cardiovascular: Negative.    Eyes: Negative.    Respiratory: Negative.     Gastrointestinal: Negative.    Genitourinary: Negative.    Musculoskeletal: Negative.    Skin: Negative.    Allergic/Immunologic: Negative.    Neurological: Negative.    Hematologic/Lymphatic: Negative.     Objective:     Physical Exam   Constitutional: She is oriented to person, place, and time.  Non-toxic appearance. She does not appear ill. No distress.   HENT:   Ears:   Right Ear: Tympanic membrane normal.   Left Ear: Tympanic membrane normal.   Nose: No rhinorrhea or congestion.   Mouth/Throat: Posterior oropharyngeal erythema (Several vesicular lesions inside the mouth on the buccal mucosa soft palate and inside the lower lips on the mucosal surface) present.   Eyes: Conjunctivae are normal.   Abdominal: Normal appearance.   Lymphadenopathy:     She has cervical " adenopathy.   Neurological: She is alert and oriented to person, place, and time.   Skin: Skin is not diaphoretic.   Psychiatric: Her behavior is normal. Mood, judgment and thought content normal.   Vitals reviewed.       Previous History      Review of patient's allergies indicates:   Allergen Reactions    Cortisone      Injection form       Past Medical History:   Diagnosis Date    Bipolar affective     [facility]    Degenerative disc disease     cervical spine    GERD (gastroesophageal reflux disease)     Hepatitis C     Genotype 1a, no previous tx, G1S1 on Bx 2/2010    Hyperlipidemia     Hypertension     Migraine headache     Raynaud's disease      Current Outpatient Medications   Medication Instructions    acetaminophen (TYLENOL) 1,000 mg, Oral, Every 8 hours PRN    acyclovir (ZOVIRAX) 800 MG Tab TAKE 1 TABLET 5 TIMES DAILY FOR 7 DAYS    ALPRAZolam (XANAX) 0.25 mg, Oral, 3 times daily    ARIPiprazole (ABILIFY) 5 mg, Oral, Nightly    buprenorphine HCL (SUBUTEX) 8 mg, Sublingual, 3 times daily    buprenorphine-naloxone 2-0.5 mg (SUBOXONE) 2-0.5 mg Subl 1 tablet, Sublingual, 2 times daily    chlordiazepoxide (LIBRIUM) 10 mg, Oral, 2 times daily PRN    ciclopirox (PENLAC) 8 % Soln Apply to affected toenails at night time DAILY. On 7th day, file nails down, clean all nails with alcohol and restart application process.    clindamycin (CLEOCIN) 150 MG capsule TAKE ONE CAPSULE EVERY 6 HOURS FOR 7 DAYS    DULoxetine (CYMBALTA) 60 mg, Oral    FLUoxetine 40 mg, Oral, Daily    HYDROcodone-acetaminophen (NORCO) 5-325 mg per tablet 1 tablet, Oral, Every 6 hours PRN    hydrocodone-acetaminophen 10-325mg (NORCO)  mg Tab 1 tablet, Oral, 4 times daily PRN    hydrocodone-acetaminophen 5-325mg (NORCO) 5-325 mg per tablet 1 tablet, Oral, Every 6 hours PRN    hydrOXYzine (ATARAX) 50 mg, Oral, Nightly    hydrOXYzine pamoate (VISTARIL) 50 mg, Oral, 3 times daily PRN    isosorbide mononitrate (IMDUR) 30 mg,  Oral, Daily    levothyroxine (SYNTHROID) 25 mcg, Oral    LIDOcaine HCl 2% (XYLOCAINE) 2 % Soln 5ml gargle and spit q4 hrs prn sore throat    lisinopriL 10 mg, Daily    lurasidone (LATUDA) 40 mg, Oral    meclizine (ANTIVERT) 25 mg, Oral, 2 times daily    meloxicam (MOBIC) 7.5 MG tablet No dose, route, or frequency recorded.    metFORMIN (GLUCOPHAGE) 500 mg, Oral, 2 times daily with meals    methocarbamoL (ROBAXIN) 500 mg, Oral, Daily    naproxen (NAPROSYN) 500 mg, Oral, Every 12 hours PRN    omeprazole (PRILOSEC) 20 MG capsule No dose, route, or frequency recorded.    ondansetron (ZOFRAN) 8 mg, Every 12 hours PRN    ondansetron (ZOFRAN-ODT) 4 mg, Oral, Every 8 hours PRN    ondansetron (ZOFRAN-ODT) 4 mg, Oral, Every 8 hours PRN    OXcarbazepine (TRILEPTAL) 300 mg, Oral, 2 times daily    pantoprazole (PROTONIX) 40 mg, Oral, Daily    promethazine (PHENERGAN) 12.5 mg, Oral, Every 6 hours PRN    sertraline (ZOLOFT) 100 mg, Oral    sucralfate (CARAFATE) 1 g, Oral, 4 times daily    traMADoL (ULTRAM-ER) 100 mg, Oral, Every 6 hours PRN    traZODone (DESYREL) 50 mg, Oral, Nightly    valACYclovir (VALTREX) 1000 MG tablet 1 tab po q8 x 7 days prn mouth lesions     Past Surgical History:   Procedure Laterality Date    BACK SURGERY      BREAST SURGERY      lumpectomy    TUBAL LIGATION      one tube and ovary removed for cyst     Family History   Problem Relation Age of Onset    Hyperlipidemia Mother     Diabetes Mother     Kidney disease Mother     Heart disease Mother     Heart disease Father     Diabetes Father     Cancer Sister         not sure  but knows its female Ca.    Cancer Paternal Grandmother         uterine Cancer       Social History     Tobacco Use    Smoking status: Former     Packs/day: 0.25     Types: Cigarettes     Quit date: 10/16/2014     Years since quittin.5    Smokeless tobacco: Never   Substance Use Topics    Alcohol use: No     Comment: quit ~10-12 yrs ago; previously heavy use    Drug use: No         "Physical Exam      Vital Signs Reviewed   /85   Pulse 103   Temp 98.4 °F (36.9 °C)   Resp 18   Ht 5' 2" (1.575 m)   Wt 42.6 kg (94 lb)   SpO2 98%   BMI 17.19 kg/m²        Procedures    Procedures     Labs     Results for orders placed or performed during the hospital encounter of 04/16/22   Blood culture #1 **CANNOT BE ORDERED STAT**    Specimen: Blood   Result Value Ref Range    Blood Culture, Routine No growth after 5 days.    Blood culture #2 **CANNOT BE ORDERED STAT**    Specimen: Blood   Result Value Ref Range    Blood Culture, Routine No growth after 5 days.    Urine culture    Specimen: Urine   Result Value Ref Range    Urine Culture, Routine No growth    CBC auto differential   Result Value Ref Range    WBC 37.28 (H) 3.90 - 12.70 K/uL    RBC 4.22 4.00 - 5.40 M/uL    Hemoglobin 12.1 12.0 - 16.0 g/dL    Hematocrit 36.4 (L) 37.0 - 48.5 %    MCV 86 82 - 98 fL    MCH 28.7 27.0 - 31.0 pg    MCHC 33.2 32.0 - 36.0 g/dL    RDW 13.2 11.5 - 14.5 %    Platelets 130 (L) 150 - 450 K/uL    MPV 13.4 (H) 9.2 - 12.9 fL    Immature Granulocytes CANCELED 0.0 - 0.5 %    Immature Grans (Abs) CANCELED 0.00 - 0.04 K/uL    nRBC 0 0 /100 WBC    Gran % 72.0 38.0 - 73.0 %    Lymph % 2.0 (L) 18.0 - 48.0 %    Mono % 0.0 (L) 4.0 - 15.0 %    Eosinophil % 0.0 0.0 - 8.0 %    Basophil % 0.0 0.0 - 1.9 %    Bands 22.0 %    Metamyelocytes 3.0 %    Myelocytes 1.0 %    Differential Method Manual    Comprehensive metabolic panel   Result Value Ref Range    Sodium 140 136 - 145 mmol/L    Potassium 4.0 3.5 - 5.1 mmol/L    Chloride 105 95 - 110 mmol/L    CO2 23 23 - 29 mmol/L    Glucose 140 (H) 70 - 110 mg/dL    BUN 81 (H) 8 - 23 mg/dL    Creatinine 5.8 (H) 0.5 - 1.4 mg/dL    Calcium 9.0 8.7 - 10.5 mg/dL    Total Protein 6.7 6.0 - 8.4 g/dL    Albumin 2.7 (L) 3.5 - 5.2 g/dL    Total Bilirubin 0.4 0.1 - 1.0 mg/dL    Alkaline Phosphatase 71 55 - 135 U/L    AST 39 10 - 40 U/L    ALT 41 10 - 44 U/L    Anion Gap 12 8 - 16 mmol/L    eGFR if "  8.2 (A) >60 mL/min/1.73 m^2    eGFR if non  7.1 (A) >60 mL/min/1.73 m^2   Amylase   Result Value Ref Range    Amylase 87 20 - 110 U/L   Lipase   Result Value Ref Range    Lipase Result 116 23 - 300 U/L   Lactic acid, plasma   Result Value Ref Range    Lactate (Lactic Acid) 2.4 (H) 0.5 - 2.2 mmol/L   TROPONIN I HIGH SENSITIVITY   Result Value Ref Range    Troponin I High Sensitivity 10.6 0.0 - 60.0 pg/mL   Ethanol   Result Value Ref Range    Alcohol, Serum <3 <10 mg/dL   Urinalysis, Reflex to Urine Culture Urine, Clean Catch    Specimen: Urine, Clean Catch   Result Value Ref Range    Specimen UA Urine, Clean Catch     Color, UA Yellow Yellow, Straw, Natacha    Appearance, UA Cloudy (A) Clear    pH, UA 5.0 5.0 - 8.0    Specific Gravity, UA 1.015 1.005 - 1.030    Protein, UA 2+ (A) Negative    Glucose, UA Negative Negative    Ketones, UA Trace (A) Negative    Bilirubin (UA) 2+ (A) Negative    Occult Blood UA Negative Negative    Nitrite, UA Negative Negative    Urobilinogen, UA 1.0 <2.0 EU/dL    Leukocytes, UA 2+ (A) Negative   Drug screen panel, emergency   Result Value Ref Range    Benzodiazepines Presumptive Positive (A) Negative    Methadone metabolites Negative Negative    Cocaine (Metab.) Presumptive Positive (A) Negative    Opiate Scrn, Ur Negative Negative    Barbiturate Screen, Ur Negative Negative    Amphetamine Screen, Ur Presumptive Positive (A) Negative    THC Negative Negative    Phencyclidine Negative Negative    Creatinine, Urine 203.0 15.0 - 325.0 mg/dL    Toxicology Information SEE COMMENT    Urinalysis Microscopic   Result Value Ref Range    RBC, UA 13 (H) 0 - 4 /hpf    WBC, UA 64 (H) 0 - 5 /hpf    Bacteria Negative None-Occ /hpf    Yeast, UA Rare (A) None    Squam Epithel, UA 12 /hpf    Hyaline Casts, UA 0 0-1/lpf /lpf    Granular Casts, UA 70.6 (A) None /lpf    Microscopic Comment SEE COMMENT    Lactic acid, plasma   Result Value Ref Range    Lactate (Lactic Acid) 2.4  (H) 0.5 - 2.2 mmol/L   Procalcitonin   Result Value Ref Range    Procalcitonin 47.23 (H) <0.25 ng/mL   CBC auto differential   Result Value Ref Range    WBC 18.45 (H) 3.90 - 12.70 K/uL    RBC 3.98 (L) 4.00 - 5.40 M/uL    Hemoglobin 11.5 (L) 12.0 - 16.0 g/dL    Hematocrit 34.7 (L) 37.0 - 48.5 %    MCV 87 82 - 98 fL    MCH 28.9 27.0 - 31.0 pg    MCHC 33.1 32.0 - 36.0 g/dL    RDW 13.5 11.5 - 14.5 %    Platelets 108 (L) 150 - 450 K/uL    MPV 13.8 (H) 9.2 - 12.9 fL    Immature Granulocytes CANCELED 0.0 - 0.5 %    Immature Grans (Abs) CANCELED 0.00 - 0.04 K/uL    Lymph # CANCELED 1.0 - 4.8 K/uL    Mono # CANCELED 0.3 - 1.0 K/uL    Eos # CANCELED 0.0 - 0.5 K/uL    Baso # CANCELED 0.00 - 0.20 K/uL    nRBC 0 0 /100 WBC    Gran % 78.0 (H) 38.0 - 73.0 %    Lymph % 6.0 (L) 18.0 - 48.0 %    Mono % 4.0 4.0 - 15.0 %    Eosinophil % 0.0 0.0 - 8.0 %    Basophil % 0.0 0.0 - 1.9 %    Bands 10.0 %    Metamyelocytes 2.0 %    Differential Method Manual    Comprehensive metabolic panel   Result Value Ref Range    Sodium 145 136 - 145 mmol/L    Potassium 4.5 3.5 - 5.1 mmol/L    Chloride 118 (H) 95 - 110 mmol/L    CO2 19 (L) 23 - 29 mmol/L    Glucose 100 70 - 110 mg/dL    BUN 73 (H) 8 - 23 mg/dL    Creatinine 3.9 (H) 0.5 - 1.4 mg/dL    Calcium 8.3 (L) 8.7 - 10.5 mg/dL    Total Protein 5.9 (L) 6.0 - 8.4 g/dL    Albumin 2.2 (L) 3.5 - 5.2 g/dL    Total Bilirubin 0.3 0.1 - 1.0 mg/dL    Alkaline Phosphatase 84 55 - 135 U/L    AST 43 (H) 10 - 40 U/L    ALT 38 10 - 44 U/L    Anion Gap 8 8 - 16 mmol/L    eGFR if African American 13.3 (A) >60 mL/min/1.73 m^2    eGFR if non African American 11.5 (A) >60 mL/min/1.73 m^2   Protein / creatinine ratio, urine   Result Value Ref Range    Protein, Urine Random 51.6 (H) 0.0 - 15.0 mg/dL    Creatinine, Urine 83.9 15.0 - 325.0 mg/dL    Prot/Creat Ratio, Urine 0.62 (H) 0.00 - 0.20   Sodium, urine, random   Result Value Ref Range    Sodium, Urine 114 20 - 250 mmol/L   Potassium, urine, random   Result Value Ref  Range    Potassium, Urine 22 15 - 95 mmol/L   Chloride, urine, random   Result Value Ref Range    Chloride, Urine 90 25 - 200 mmol/L   Protein Electrophoresis, Random Urine   Result Value Ref Range    Protein Electrophoresis, Ur Random SEE COMMENT    CBC Auto Differential   Result Value Ref Range    WBC 13.08 (H) 3.90 - 12.70 K/uL    RBC 4.75 4.00 - 5.40 M/uL    Hemoglobin 13.7 12.0 - 16.0 g/dL    Hematocrit 40.8 37.0 - 48.5 %    MCV 86 82 - 98 fL    MCH 28.8 27.0 - 31.0 pg    MCHC 33.6 32.0 - 36.0 g/dL    RDW 13.5 11.5 - 14.5 %    Platelets 127 (L) 150 - 450 K/uL    MPV 12.9 9.2 - 12.9 fL    Immature Granulocytes 0.8 (H) 0.0 - 0.5 %    Gran # (ANC) 12.1 (H) 1.8 - 7.7 K/uL    Immature Grans (Abs) 0.10 (H) 0.00 - 0.04 K/uL    Lymph # 0.6 (L) 1.0 - 4.8 K/uL    Mono # 0.3 0.3 - 1.0 K/uL    Eos # 0.0 0.0 - 0.5 K/uL    Baso # 0.02 0.00 - 0.20 K/uL    nRBC 0 0 /100 WBC    Gran % 92.6 (H) 38.0 - 73.0 %    Lymph % 4.4 (L) 18.0 - 48.0 %    Mono % 2.0 (L) 4.0 - 15.0 %    Eosinophil % 0.0 0.0 - 8.0 %    Basophil % 0.2 0.0 - 1.9 %    Differential Method Automated    Comprehensive Metabolic Panel   Result Value Ref Range    Sodium 144 136 - 145 mmol/L    Potassium 4.5 3.5 - 5.1 mmol/L    Chloride 119 (H) 95 - 110 mmol/L    CO2 18 (L) 23 - 29 mmol/L    Glucose 117 (H) 70 - 110 mg/dL    BUN 44 (H) 8 - 23 mg/dL    Creatinine 1.9 (H) 0.5 - 1.4 mg/dL    Calcium 8.9 8.7 - 10.5 mg/dL    Total Protein 6.7 6.0 - 8.4 g/dL    Albumin 2.4 (L) 3.5 - 5.2 g/dL    Total Bilirubin 0.6 0.1 - 1.0 mg/dL    Alkaline Phosphatase 69 55 - 135 U/L    AST 43 (H) 10 - 40 U/L    ALT 42 10 - 44 U/L    Anion Gap 7 (L) 8 - 16 mmol/L    eGFR if  31.7 (A) >60 mL/min/1.73 m^2    eGFR if non African American 27.5 (A) >60 mL/min/1.73 m^2   CBC Auto Differential   Result Value Ref Range    WBC 12.70 3.90 - 12.70 K/uL    RBC 5.32 4.00 - 5.40 M/uL    Hemoglobin 15.3 12.0 - 16.0 g/dL    Hematocrit 44.2 37.0 - 48.5 %    MCV 83 82 - 98 fL    MCH 28.8  27.0 - 31.0 pg    MCHC 34.6 32.0 - 36.0 g/dL    RDW 12.8 11.5 - 14.5 %    Platelets 166 150 - 450 K/uL    MPV 12.2 9.2 - 12.9 fL    Immature Granulocytes 1.9 (H) 0.0 - 0.5 %    Gran # (ANC) 10.9 (H) 1.8 - 7.7 K/uL    Immature Grans (Abs) 0.24 (H) 0.00 - 0.04 K/uL    Lymph # 1.0 1.0 - 4.8 K/uL    Mono # 0.6 0.3 - 1.0 K/uL    Eos # 0.0 0.0 - 0.5 K/uL    Baso # 0.04 0.00 - 0.20 K/uL    nRBC 0 0 /100 WBC    Gran % 85.9 (H) 38.0 - 73.0 %    Lymph % 7.5 (L) 18.0 - 48.0 %    Mono % 4.4 4.0 - 15.0 %    Eosinophil % 0.0 0.0 - 8.0 %    Basophil % 0.3 0.0 - 1.9 %    Differential Method Automated    Comprehensive Metabolic Panel   Result Value Ref Range    Sodium 137 136 - 145 mmol/L    Potassium 3.4 (L) 3.5 - 5.1 mmol/L    Chloride 107 95 - 110 mmol/L    CO2 20 (L) 23 - 29 mmol/L    Glucose 131 (H) 70 - 110 mg/dL    BUN 23 8 - 23 mg/dL    Creatinine 1.1 0.5 - 1.4 mg/dL    Calcium 8.9 8.7 - 10.5 mg/dL    Total Protein 7.4 6.0 - 8.4 g/dL    Albumin 2.6 (L) 3.5 - 5.2 g/dL    Total Bilirubin 1.1 (H) 0.1 - 1.0 mg/dL    Alkaline Phosphatase 82 55 - 135 U/L    AST 58 (H) 10 - 40 U/L    ALT 52 (H) 10 - 44 U/L    Anion Gap 10 8 - 16 mmol/L    eGFR if African American >60.0 >60 mL/min/1.73 m^2    eGFR if non  53.2 (A) >60 mL/min/1.73 m^2   Pathologist Interpretation UPE   Result Value Ref Range    Pathologist Interpretation UPE REVIEWED    POCT COVID-19 Rapid Screening   Result Value Ref Range    POC Rapid COVID Negative Negative     Acceptable Yes        Assessment:     1. Herpes virus infection of oral mucosa        Plan:   Medications sent to pharmacy  Start the valacyclovir as soon as possible  Pain medication may cause drowsiness.  Do not drink alcohol or drive when taking it.  While you have the lesions in your mouth you should avoid spicy foods, acidic foods or drinks or temperature hot foods or drinks as this may irritate the lesions cause more pain.  Contact this clinic with any concerns    Herpes  virus infection of oral mucosa    Other orders  -     valACYclovir (VALTREX) 1000 MG tablet; 1 tab po q8 x 7 days prn mouth lesions  Dispense: 42 tablet; Refill: 5  -     LIDOcaine HCl 2% (XYLOCAINE) 2 % Soln; 5ml gargle and spit q4 hrs prn sore throat  Dispense: 100 mL; Refill: 1  -     HYDROcodone-acetaminophen (NORCO) 5-325 mg per tablet; Take 1 tablet by mouth every 6 (six) hours as needed for Pain.  Dispense: 20 tablet; Refill: 0

## 2023-05-16 NOTE — PATIENT INSTRUCTIONS
Medications sent to pharmacy  Start the valacyclovir as soon as possible  Pain medication may cause drowsiness.  Do not drink alcohol or drive when taking it.  While you have the lesions in your mouth you should avoid spicy foods, acidic foods or drinks or temperature hot foods or drinks as this may irritate the lesions cause more pain.  Contact this clinic with any concerns

## 2024-03-05 ENCOUNTER — OFFICE VISIT (OUTPATIENT)
Dept: URGENT CARE | Facility: CLINIC | Age: 64
End: 2024-03-05
Payer: MEDICARE

## 2024-03-05 VITALS
OXYGEN SATURATION: 98 % | BODY MASS INDEX: 17.3 KG/M2 | HEART RATE: 67 BPM | SYSTOLIC BLOOD PRESSURE: 170 MMHG | RESPIRATION RATE: 18 BRPM | WEIGHT: 94 LBS | TEMPERATURE: 98 F | HEIGHT: 62 IN | DIASTOLIC BLOOD PRESSURE: 83 MMHG

## 2024-03-05 DIAGNOSIS — K29.70 GASTRITIS, PRESENCE OF BLEEDING UNSPECIFIED, UNSPECIFIED CHRONICITY, UNSPECIFIED GASTRITIS TYPE: ICD-10-CM

## 2024-03-05 DIAGNOSIS — M25.511 CHRONIC RIGHT SHOULDER PAIN: Primary | ICD-10-CM

## 2024-03-05 DIAGNOSIS — G89.29 CHRONIC RIGHT SHOULDER PAIN: Primary | ICD-10-CM

## 2024-03-05 PROCEDURE — 99214 OFFICE O/P EST MOD 30 MIN: CPT | Mod: 25,,, | Performed by: FAMILY MEDICINE

## 2024-03-05 PROCEDURE — 96372 THER/PROPH/DIAG INJ SC/IM: CPT | Mod: ,,, | Performed by: FAMILY MEDICINE

## 2024-03-05 RX ORDER — DICLOFENAC SODIUM 10 MG/G
2 GEL TOPICAL 4 TIMES DAILY PRN
Qty: 50 G | Refills: 0 | Status: SHIPPED | OUTPATIENT
Start: 2024-03-05

## 2024-03-05 RX ORDER — KETOROLAC TROMETHAMINE 30 MG/ML
30 INJECTION, SOLUTION INTRAMUSCULAR; INTRAVENOUS
Status: COMPLETED | OUTPATIENT
Start: 2024-03-05 | End: 2024-03-05

## 2024-03-05 RX ORDER — SUCRALFATE 1 G/1
1 TABLET ORAL 4 TIMES DAILY
Qty: 56 TABLET | Refills: 0 | Status: SHIPPED | OUTPATIENT
Start: 2024-03-05 | End: 2024-03-19

## 2024-03-05 RX ORDER — PROMETHAZINE HYDROCHLORIDE 25 MG/1
25 TABLET ORAL EVERY 6 HOURS PRN
Qty: 16 TABLET | Refills: 0 | Status: SHIPPED | OUTPATIENT
Start: 2024-03-05 | End: 2024-03-09

## 2024-03-05 RX ORDER — PANTOPRAZOLE SODIUM 40 MG/1
40 TABLET, DELAYED RELEASE ORAL 2 TIMES DAILY
Qty: 28 TABLET | Refills: 0 | Status: SHIPPED | OUTPATIENT
Start: 2024-03-05 | End: 2024-03-19

## 2024-03-05 RX ORDER — HYDROCODONE BITARTRATE AND ACETAMINOPHEN 5; 325 MG/1; MG/1
1 TABLET ORAL EVERY 6 HOURS PRN
Qty: 16 TABLET | Refills: 0 | Status: SHIPPED | OUTPATIENT
Start: 2024-03-05 | End: 2024-03-09

## 2024-03-05 RX ADMIN — KETOROLAC TROMETHAMINE 30 MG: 30 INJECTION, SOLUTION INTRAMUSCULAR; INTRAVENOUS at 06:03

## 2024-03-05 NOTE — LETTER
March 5, 2024      [facility]  [address]  [place] [address]  Phone: [phone]       Patient: Zully Jerry   YOB: 1960  Date of Visit: 03/05/2024    To Whom It May Concern:    Angie Jerry  was at [facility] on 03/05/2024. The patient may return to work/school on 03/07/2024 with no restrictions. If you have any questions or concerns, or if I can be of further assistance, please do not hesitate to contact me.    Sincerely,    Perri Arzola MA

## 2024-03-06 ENCOUNTER — TELEPHONE (OUTPATIENT)
Dept: URGENT CARE | Facility: CLINIC | Age: 64
End: 2024-03-06
Payer: MEDICARE

## 2024-03-06 NOTE — PATIENT INSTRUCTIONS
Plan:   X-ray:  No fracture or dislocation.  Moderate amount of degenerative changes  Medications sent to pharmacy  Cut back or stop smoking  Avoid NSAIDs if possible likely contributing to your stomach pains.  Take Tylenol if needed.  Avoid the acidic foods and drinks that we discussed  Call Dr. Yang office tomorrow.  A referral has been placed to his office.  You have also been referred to orthopedics.  They will call you for an appointment.  If your abdominal pain worsens, you have black tarry appearing stools, your vomit has the appearance of coffee-grounds, go to the emergency department immediately
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
## 2024-03-06 NOTE — TELEPHONE ENCOUNTER
Patient was prescribed medications in clinic yesterday including diclofenac gel, Norco, Protonix, promethazine, Carafate.  Pharmacy called into clinic to state that they will not fill the Norco prescription as patient is on Suboxone.  Call patient to let her know, she may follow with her primary care and take other prescribed medications, otherwise follow plan of care said by Dr. Tirado yesterday.  Call pharmacy back and inform them no other medications will be prescribed.

## 2024-03-08 NOTE — TELEPHONE ENCOUNTER
Spoke with pt in which we spoke about her xray results and told her to follow up with her pcp with any other concerns and to follow the doctors she saw in clinic tx plan and recommendations and she expressed understanding.

## 2024-06-25 ENCOUNTER — HOSPITAL ENCOUNTER (OUTPATIENT)
Dept: RADIOLOGY | Facility: HOSPITAL | Age: 64
Discharge: HOME | End: 2024-06-25
Attending: FAMILY MEDICINE
Payer: MEDICARE

## 2024-06-25 DIAGNOSIS — Z12.31 SCREENING MAMMOGRAM FOR BREAST CANCER: ICD-10-CM

## 2024-06-25 PROCEDURE — 77067 SCR MAMMO BI INCL CAD: CPT | Mod: 26,,, | Performed by: RADIOLOGY

## 2024-06-25 PROCEDURE — 77067 SCR MAMMO BI INCL CAD: CPT | Mod: TC

## 2024-06-25 PROCEDURE — 77063 BREAST TOMOSYNTHESIS BI: CPT | Mod: 26,,, | Performed by: RADIOLOGY

## 2024-07-08 ENCOUNTER — OFFICE VISIT (OUTPATIENT)
Dept: URGENT CARE | Facility: CLINIC | Age: 64
End: 2024-07-08
Payer: MEDICARE

## 2024-07-08 VITALS
DIASTOLIC BLOOD PRESSURE: 75 MMHG | SYSTOLIC BLOOD PRESSURE: 98 MMHG | RESPIRATION RATE: 18 BRPM | TEMPERATURE: 97 F | OXYGEN SATURATION: 98 % | WEIGHT: 88 LBS | BODY MASS INDEX: 16.2 KG/M2 | HEART RATE: 101 BPM | HEIGHT: 62 IN

## 2024-07-08 DIAGNOSIS — R11.0 NAUSEA: ICD-10-CM

## 2024-07-08 DIAGNOSIS — R21 RASH: Primary | ICD-10-CM

## 2024-07-08 PROCEDURE — 99213 OFFICE O/P EST LOW 20 MIN: CPT | Mod: ,,,

## 2024-07-08 RX ORDER — PERMETHRIN 50 MG/G
CREAM TOPICAL ONCE
Qty: 60 G | Refills: 0 | Status: SHIPPED | OUTPATIENT
Start: 2024-07-08 | End: 2024-07-08

## 2024-07-08 RX ORDER — PANTOPRAZOLE SODIUM 40 MG/1
40 TABLET, DELAYED RELEASE ORAL DAILY
Qty: 30 TABLET | Refills: 0 | Status: SHIPPED | OUTPATIENT
Start: 2024-07-08 | End: 2024-08-07

## 2024-07-08 RX ORDER — ONDANSETRON 4 MG/1
4 TABLET, ORALLY DISINTEGRATING ORAL EVERY 12 HOURS PRN
Qty: 6 TABLET | Refills: 0 | Status: SHIPPED | OUTPATIENT
Start: 2024-07-08 | End: 2024-07-11

## 2024-07-08 RX ORDER — HYDROXYZINE PAMOATE 25 MG/1
25 CAPSULE ORAL EVERY 8 HOURS PRN
Qty: 9 CAPSULE | Refills: 0 | Status: SHIPPED | OUTPATIENT
Start: 2024-07-08 | End: 2024-07-11

## 2024-07-08 NOTE — PATIENT INSTRUCTIONS
Apply the ointment as directed   Clean your sheets and clothes in hot water   Avoid picking or scratching the areas; clean them sparingly   Follow up with your pcp in the next 1-2 weeks   Monitor for any increased redness, drainage or fever and seek care immediatly of this occurs     Increase fluid intake and monitor for signs of dehydration including dark colored urine, weakness, lethargy, dizziness, etc.   Get plenty of rest.   Take the Protonix daily   BRAT Diet: Begin eating a BRAT diet as tolerated (bananas, plain rice, apple sauce, plain toast).  Nausea: Zofran as needed for nausea/vomiting   Follow-up with your Primary Care Provider this week   Present to the nearest Emergency Department with any significant change or worsening symptoms.

## 2024-07-08 NOTE — PROGRESS NOTES
"Subjective:      Patient ID: Zully Jerry is a 64 y.o. female.    Vitals:  height is 5' 2" (1.575 m) and weight is 39.9 kg (88 lb). Her temperature is 97.2 °F (36.2 °C). Her blood pressure is 98/75 and her pulse is 101. Her respiration is 18 and oxygen saturation is 98%.     Chief Complaint: Nausea     Patient is a 64 y.o. female with a pmh of bipolar, Hep C, HTN, and GERd who presents to urgent care with complaints of  intermittent nausea and vomiting x3 days. Alleviating factors include Zofran with mild amount of relief. She denies any fever, chills, vomiting today, diarrhea, sob, cp, n/v/d, neck stiffness, or changes in output. She reports current infection with hep C and that she is waiting for treatment from her GI doctor.     She also c/o bug bites to her face, arms, legs and hands intermittently x a few weeks. She reports seeing small black bugs bite her, possibly chiggers, she requested permethrin cream to apply as this has helped her in the past, she stated it was not for scabies that it was for possible chiggers. The patient appeared anxious and distressed about the bites/bugs. She was aaox3 and her thinking was organized. She is currently on medication for bipolar and denies any current illicit drug use.         Constitution: Negative for chills and fever.   HENT: Negative.     Eyes: Negative.    Respiratory:  Negative for cough and shortness of breath.    Gastrointestinal:  Positive for nausea and vomiting. Negative for abdominal pain, constipation, diarrhea and rectal bleeding.   Genitourinary: Negative.    Skin:  Positive for rash.      Objective:     Physical Exam   Constitutional: She is oriented to person, place, and time. She appears well-developed. She is cooperative.  Non-toxic appearance. She does not appear ill. No distress.   HENT:   Head: Normocephalic and atraumatic.   Ears:   Right Ear: Hearing and external ear normal.   Left Ear: Hearing and external ear normal.   Mouth/Throat: " Oropharynx is clear and moist and mucous membranes are normal. Mucous membranes are moist. Oropharynx is clear.   Eyes: Conjunctivae and lids are normal.   Neck: Trachea normal and phonation normal. Neck supple. No edema present. No erythema present. No neck rigidity present.   Cardiovascular: Normal rate.   Pulmonary/Chest: Effort normal and breath sounds normal. No stridor. No respiratory distress. She has no decreased breath sounds. She has no wheezes. She has no rhonchi. She has no rales.   Abdominal: Normal appearance and bowel sounds are normal. She exhibits no distension. Soft. flat abdomen There is no abdominal tenderness. There is no rebound and no guarding.   Neurological: She is alert and oriented to person, place, and time. She exhibits normal muscle tone.   Skin: Skin is warm, dry, intact, not diaphoretic and rash. Capillary refill takes less than 2 seconds.         Comments: There are multiple erythematous exoriated lesions noted around the mouth, on the dorsal hands bilaterally on the forearms and on the bilateral upper and lower legs, no significant lesions noted to the webs of the fingers; patient appears to be scratching/picking the areas   Psychiatric: She experiences Normal attention. Her speech is normal and behavior is normal. Thought content normal. Her mood appears anxious. Her affect is tearful. Her affect is not inappropriate. Thought content is not delusional. She does not exhibit a depressed mood.   Nursing note and vitals reviewed.      Assessment:     1. Rash    2. Nausea        Plan:       Rash    Nausea    Other orders  -     pantoprazole (PROTONIX) 40 MG tablet; Take 1 tablet (40 mg total) by mouth once daily.  Dispense: 30 tablet; Refill: 0  -     permethrin (ELIMITE) 5 % cream; Apply topically once. Apply the cream from head to foot, pay special attention to the creases of the skin, hands, and feet; leave on the skin for 8-12 hours and then wash off. for 1 dose  Dispense: 60 g;  Refill: 0  -     ondansetron (ZOFRAN-ODT) 4 MG TbDL; Take 1 tablet (4 mg total) by mouth every 12 (twelve) hours as needed (nausea).  Dispense: 6 tablet; Refill: 0  -     hydrOXYzine pamoate (VISTARIL) 25 MG Cap; Take 1 capsule (25 mg total) by mouth every 8 (eight) hours as needed (itching/anxiety).  Dispense: 9 capsule; Refill: 0      Apply the ointment as directed   Clean your sheets and clothes in hot water   Vistaril as needed for itching/anxiety, caution as it may cause sedation, do not drive or drink while taking it.   Avoid picking or scratching the areas; clean them sparingly   Follow up with your pcp in the next 1-2 weeks   Monitor for any increased redness, drainage or fever and seek care immediatly of this occurs     Increase fluid intake and monitor for signs of dehydration including dark colored urine, weakness, lethargy, dizziness, etc.   Get plenty of rest.   Take the Protonix daily   BRAT Diet: Begin eating a BRAT diet as tolerated (bananas, plain rice, apple sauce, plain toast).  Nausea: Zofran as needed for nausea/vomiting   Follow-up with your Primary Care Provider this week   Present to the nearest Emergency Department with any significant change or worsening symptoms.

## 2024-07-08 NOTE — LETTER
July 8, 2024      [facility]  [address] [address]  Phone: [phone]       Patient: Zully Jerry   YOB: 1960  Date of Visit: 07/08/2024    To Whom It May Concern:    Angie Jerry  was at [facility] on 07/08/2024. The patient may return to work/school on 07/10/24 with no restrictions. If you have any questions or concerns, or if I can be of further assistance, please do not hesitate to contact me.    Sincerely,    Troy York MA

## 2024-07-24 ENCOUNTER — HOSPITAL ENCOUNTER (OUTPATIENT)
Dept: RADIOLOGY | Facility: HOSPITAL | Age: 64
Discharge: HOME | End: 2024-07-24
Attending: FAMILY MEDICINE
Payer: MEDICARE

## 2024-07-24 DIAGNOSIS — R92.8 ABNORMAL MAMMOGRAM: ICD-10-CM

## 2024-07-24 PROCEDURE — 76642 ULTRASOUND BREAST LIMITED: CPT | Mod: 26,LT,, | Performed by: RADIOLOGY

## 2024-07-24 PROCEDURE — 76642 ULTRASOUND BREAST LIMITED: CPT | Mod: TC,LT

## 2024-07-24 PROCEDURE — 77061 BREAST TOMOSYNTHESIS UNI: CPT | Mod: TC,LT

## 2024-07-24 PROCEDURE — 77065 DX MAMMO INCL CAD UNI: CPT | Mod: 26,LT,, | Performed by: RADIOLOGY

## 2024-07-24 PROCEDURE — 77061 BREAST TOMOSYNTHESIS UNI: CPT | Mod: 26,LT,, | Performed by: RADIOLOGY

## 2024-08-26 ENCOUNTER — OFFICE VISIT (OUTPATIENT)
Dept: URGENT CARE | Facility: CLINIC | Age: 64
End: 2024-08-26
Payer: MEDICARE

## 2024-08-26 VITALS
HEIGHT: 62 IN | TEMPERATURE: 98 F | SYSTOLIC BLOOD PRESSURE: 174 MMHG | WEIGHT: 85 LBS | BODY MASS INDEX: 15.64 KG/M2 | DIASTOLIC BLOOD PRESSURE: 110 MMHG | OXYGEN SATURATION: 100 % | RESPIRATION RATE: 24 BRPM | HEART RATE: 72 BPM

## 2024-08-26 DIAGNOSIS — R11.0 NAUSEA: ICD-10-CM

## 2024-08-26 DIAGNOSIS — R10.9 ABDOMINAL PAIN, UNSPECIFIED ABDOMINAL LOCATION: Primary | ICD-10-CM

## 2024-08-26 LAB
CTP QC/QA: YES
SARS-COV-2 AG RESP QL IA.RAPID: NEGATIVE

## 2024-08-26 PROCEDURE — 87811 SARS-COV-2 COVID19 W/OPTIC: CPT | Mod: QW,,, | Performed by: PHYSICIAN ASSISTANT

## 2024-08-26 PROCEDURE — 99213 OFFICE O/P EST LOW 20 MIN: CPT | Mod: ,,, | Performed by: PHYSICIAN ASSISTANT

## 2024-08-26 NOTE — PATIENT INSTRUCTIONS
As discussed, it is recommended that you present to the ER now for further evaluation to prevent a delay in care.   Offered transport via EMS but patient/family declines  Opts for private transport via personal vehicle.       Smoking cessation strongly encouraged.  Assistance offered, information will be provided.  If not ready to quit now, patient will contact this clinic in the future if I can be of any specific help related to the discontinuation of smoking/tobacco use.

## 2024-08-26 NOTE — PROGRESS NOTES
"Subjective:      Patient ID: Zully Jerry is a 64 y.o. female.    Vitals:  height is 5' 2" (1.575 m) and weight is 38.6 kg (85 lb). Her temperature is 97.5 °F (36.4 °C). Her blood pressure is 174/110 (abnormal) and her pulse is 72. Her respiration is 24 (abnormal) and oxygen saturation is 100%.     Chief Complaint: No chief complaint on file.     Patient is a 64 y.o. female who presents to urgent care with complaints of bad nausea, chills, body aches, and headache. X10 days   ROS   Objective:     Physical Exam    Assessment:     1. Nausea        Plan:       Nausea  -     SARS Coronavirus 2 Antigen, POCT Manual Read                    "

## 2024-08-26 NOTE — PROGRESS NOTES
"Subjective:      Patient ID: Zully Jerry is a 64 y.o. female.    Vitals:  height is 5' 2" (1.575 m) and weight is 38.6 kg (85 lb). Her temperature is 97.5 °F (36.4 °C). Her blood pressure is 174/110 (abnormal) and her pulse is 72. Her respiration is 24 (abnormal) and oxygen saturation is 100%.     Chief Complaint: No chief complaint on file.    The patient is a 64-year-old female with a past medical history of bipolar disorder, substance abuse, hepatitis-C, hypertension who presents to urgent care complaining of severe abdominal pain and nausea.  She states that symptoms started approximately 1-1/2 weeks ago with mild-to-moderate intermittent nausea.  She states that this morning the nausea and abdominal pain significantly intensified.  She did have current for COVID exposure but denies any runny nose sore throat cough or fever.      ROS   Objective:     Physical Exam   Constitutional: She is oriented to person, place, and time. She appears well-developed. She appears ill.   HENT:   Head: Normocephalic and atraumatic.   Ears:   Right Ear: External ear normal.   Left Ear: External ear normal.   Nose: Nose normal.   Mouth/Throat: Mucous membranes are normal. Mucous membranes are moist. No posterior oropharyngeal erythema.   Eyes: Conjunctivae and lids are normal.   Neck: Trachea normal. Neck supple.   Cardiovascular: Normal rate, regular rhythm and normal heart sounds.   Pulmonary/Chest: Effort normal and breath sounds normal. No respiratory distress.   Abdominal: Bowel sounds are normal. She exhibits no distension and no mass. Soft. There is abdominal tenderness.   Musculoskeletal: Normal range of motion.         General: Normal range of motion.   Neurological: She is alert and oriented to person, place, and time. She has normal strength.   Skin: Skin is warm, dry, intact, not diaphoretic and not pale.   Psychiatric: Her speech is normal and behavior is normal. Judgment and thought content normal.   Nursing note " and vitals reviewed.  Positive for TTP epigastric and right upper quadrant areas.  Patient appears in severe pain       Previous History      Review of patient's allergies indicates:  No Active Allergies    Past Medical History:   Diagnosis Date    Bipolar affective     [facility]    Degenerative disc disease     cervical spine    GERD (gastroesophageal reflux disease)     Hepatitis C     Genotype 1a, no previous tx, G1S1 on Bx 2/2010    Hyperlipidemia     Hypertension     Migraine headache     Raynaud's disease      Current Outpatient Medications   Medication Instructions    acetaminophen (TYLENOL) 1,000 mg, Oral, Every 8 hours PRN    acyclovir (ZOVIRAX) 800 MG Tab TAKE 1 TABLET 5 TIMES DAILY FOR 7 DAYS    ALPRAZolam (XANAX) 0.25 mg, Oral, 3 times daily    ARIPiprazole (ABILIFY) 5 mg, Oral, Nightly    buprenorphine HCL (SUBUTEX) 8 mg, Sublingual, 3 times daily    buprenorphine-naloxone 2-0.5 mg (SUBOXONE) 2-0.5 mg Subl 1 tablet, Sublingual, 2 times daily    chlordiazepoxide (LIBRIUM) 10 mg, Oral, 2 times daily PRN    ciclopirox (PENLAC) 8 % Soln Apply to affected toenails at night time DAILY. On 7th day, file nails down, clean all nails with alcohol and restart application process.    clindamycin (CLEOCIN) 150 MG capsule TAKE ONE CAPSULE EVERY 6 HOURS FOR 7 DAYS    diclofenac sodium (VOLTAREN) 2 g, Topical (Top), 4 times daily PRN    DULoxetine (CYMBALTA) 60 mg, Oral    FLUoxetine 40 mg, Daily    hydrOXYzine (ATARAX) 50 mg, Oral, Nightly    hydrOXYzine pamoate (VISTARIL) 50 mg, Oral, 3 times daily PRN    isosorbide mononitrate (IMDUR) 30 mg, Oral, Daily    levothyroxine (SYNTHROID) 25 mcg, Oral    LIDOcaine HCl 2% (XYLOCAINE) 2 % Soln 5ml gargle and spit q4 hrs prn sore throat    lisinopriL 10 mg, Daily    lurasidone (LATUDA) 40 mg    meclizine (ANTIVERT) 25 mg, Oral, 2 times daily    meloxicam (MOBIC) 7.5 MG tablet No dose, route, or frequency recorded.    metFORMIN (GLUCOPHAGE) 500 mg, 2 times  "daily with meals    methocarbamoL (ROBAXIN) 500 mg, Daily    naproxen (NAPROSYN) 500 mg, Oral, Every 12 hours PRN    omeprazole (PRILOSEC) 20 MG capsule No dose, route, or frequency recorded.    ondansetron (ZOFRAN) 8 mg, Every 12 hours PRN    ondansetron (ZOFRAN-ODT) 4 mg, Oral, Every 8 hours PRN    ondansetron (ZOFRAN-ODT) 4 mg, Oral, Every 8 hours PRN    OXcarbazepine (TRILEPTAL) 300 mg, Oral, 2 times daily    pantoprazole (PROTONIX) 40 mg, Oral, Daily    promethazine (PHENERGAN) 12.5 mg, Oral, Every 6 hours PRN    sertraline (ZOLOFT) 100 mg, Oral    sucralfate (CARAFATE) 1 g, Oral, 4 times daily    traMADoL (ULTRAM-ER) 100 mg, Every 6 hours PRN    traZODone (DESYREL) 50 mg, Oral, Nightly    valACYclovir (VALTREX) 1000 MG tablet 1 tab po q8 x 7 days prn mouth lesions     Past Surgical History:   Procedure Laterality Date    BACK SURGERY      BREAST SURGERY      lumpectomy    TUBAL LIGATION      one tube and ovary removed for cyst     Family History   Problem Relation Name Age of Onset    Hyperlipidemia Mother      Diabetes Mother      Kidney disease Mother      Heart disease Mother      Heart disease Father      Diabetes Father      Cancer Sister          not sure  but knows its female Ca.    Cancer Paternal Grandmother          uterine Cancer       Social History     Tobacco Use    Smoking status: Every Day     Current packs/day: 0.00     Types: Cigarettes     Last attempt to quit: 10/16/2014     Years since quittin.8    Smokeless tobacco: Never   Substance Use Topics    Alcohol use: No     Comment: quit ~10-12 yrs ago; previously heavy use    Drug use: No        Physical Exam      Vital Signs Reviewed   BP (!) 174/110   Pulse 72   Temp 97.5 °F (36.4 °C)   Resp (!) 24   Ht 5' 2" (1.575 m)   Wt 38.6 kg (85 lb)   SpO2 100%   BMI 15.55 kg/m²        Procedures    Procedures     Labs     Results for orders placed or performed during the hospital encounter of 22   Blood culture #1 **CANNOT BE ORDERED " STAT**    Specimen: Blood   Result Value Ref Range    Blood Culture, Routine No growth after 5 days.    Blood culture #2 **CANNOT BE ORDERED STAT**    Specimen: Blood   Result Value Ref Range    Blood Culture, Routine No growth after 5 days.    Urine culture    Specimen: Urine   Result Value Ref Range    Urine Culture, Routine No growth    CBC auto differential   Result Value Ref Range    WBC 37.28 (H) 3.90 - 12.70 K/uL    RBC 4.22 4.00 - 5.40 M/uL    Hemoglobin 12.1 12.0 - 16.0 g/dL    Hematocrit 36.4 (L) 37.0 - 48.5 %    MCV 86 82 - 98 fL    MCH 28.7 27.0 - 31.0 pg    MCHC 33.2 32.0 - 36.0 g/dL    RDW 13.2 11.5 - 14.5 %    Platelets 130 (L) 150 - 450 K/uL    MPV 13.4 (H) 9.2 - 12.9 fL    Immature Granulocytes CANCELED 0.0 - 0.5 %    Immature Grans (Abs) CANCELED 0.00 - 0.04 K/uL    nRBC 0 0 /100 WBC    Gran % 72.0 38.0 - 73.0 %    Lymph % 2.0 (L) 18.0 - 48.0 %    Mono % 0.0 (L) 4.0 - 15.0 %    Eosinophil % 0.0 0.0 - 8.0 %    Basophil % 0.0 0.0 - 1.9 %    Bands 22.0 %    Metamyelocytes 3.0 %    Myelocytes 1.0 %    Differential Method Manual    Comprehensive metabolic panel   Result Value Ref Range    Sodium 140 136 - 145 mmol/L    Potassium 4.0 3.5 - 5.1 mmol/L    Chloride 105 95 - 110 mmol/L    CO2 23 23 - 29 mmol/L    Glucose 140 (H) 70 - 110 mg/dL    BUN 81 (H) 8 - 23 mg/dL    Creatinine 5.8 (H) 0.5 - 1.4 mg/dL    Calcium 9.0 8.7 - 10.5 mg/dL    Total Protein 6.7 6.0 - 8.4 g/dL    Albumin 2.7 (L) 3.5 - 5.2 g/dL    Total Bilirubin 0.4 0.1 - 1.0 mg/dL    Alkaline Phosphatase 71 55 - 135 U/L    AST 39 10 - 40 U/L    ALT 41 10 - 44 U/L    Anion Gap 12 8 - 16 mmol/L    eGFR if African American 8.2 (A) >60 mL/min/1.73 m^2    eGFR if non  7.1 (A) >60 mL/min/1.73 m^2   Amylase   Result Value Ref Range    Amylase 87 20 - 110 U/L   Lipase   Result Value Ref Range    Lipase Result 116 23 - 300 U/L   Lactic acid, plasma   Result Value Ref Range    Lactate (Lactic Acid) 2.4 (H) 0.5 - 2.2 mmol/L   TROPONIN I  HIGH SENSITIVITY   Result Value Ref Range    Troponin I High Sensitivity 10.6 0.0 - 60.0 pg/mL   Ethanol   Result Value Ref Range    Alcohol, Serum <3 <10 mg/dL   Urinalysis, Reflex to Urine Culture Urine, Clean Catch    Specimen: Urine, Clean Catch   Result Value Ref Range    Specimen UA Urine, Clean Catch     Color, UA Yellow Yellow, Straw, Natacha    Appearance, UA Cloudy (A) Clear    pH, UA 5.0 5.0 - 8.0    Specific Gravity, UA 1.015 1.005 - 1.030    Protein, UA 2+ (A) Negative    Glucose, UA Negative Negative    Ketones, UA Trace (A) Negative    Bilirubin (UA) 2+ (A) Negative    Occult Blood UA Negative Negative    Nitrite, UA Negative Negative    Urobilinogen, UA 1.0 <2.0 EU/dL    Leukocytes, UA 2+ (A) Negative   Drug screen panel, emergency   Result Value Ref Range    Benzodiazepines Presumptive Positive (A) Negative    Methadone metabolites Negative Negative    Cocaine (Metab.) Presumptive Positive (A) Negative    Opiate Scrn, Ur Negative Negative    Barbiturate Screen, Ur Negative Negative    Amphetamine Screen, Ur Presumptive Positive (A) Negative    THC Negative Negative    Phencyclidine Negative Negative    Creatinine, Urine 203.0 15.0 - 325.0 mg/dL    Toxicology Information SEE COMMENT    Urinalysis Microscopic   Result Value Ref Range    RBC, UA 13 (H) 0 - 4 /hpf    WBC, UA 64 (H) 0 - 5 /hpf    Bacteria Negative None-Occ /hpf    Yeast, UA Rare (A) None    Squam Epithel, UA 12 /hpf    Hyaline Casts, UA 0 0-1/lpf /lpf    Granular Casts, UA 70.6 (A) None /lpf    Microscopic Comment SEE COMMENT    Lactic acid, plasma   Result Value Ref Range    Lactate (Lactic Acid) 2.4 (H) 0.5 - 2.2 mmol/L   Procalcitonin   Result Value Ref Range    Procalcitonin 47.23 (H) <0.25 ng/mL   CBC auto differential   Result Value Ref Range    WBC 18.45 (H) 3.90 - 12.70 K/uL    RBC 3.98 (L) 4.00 - 5.40 M/uL    Hemoglobin 11.5 (L) 12.0 - 16.0 g/dL    Hematocrit 34.7 (L) 37.0 - 48.5 %    MCV 87 82 - 98 fL    MCH 28.9 27.0 - 31.0 pg     MCHC 33.1 32.0 - 36.0 g/dL    RDW 13.5 11.5 - 14.5 %    Platelets 108 (L) 150 - 450 K/uL    MPV 13.8 (H) 9.2 - 12.9 fL    Immature Granulocytes CANCELED 0.0 - 0.5 %    Immature Grans (Abs) CANCELED 0.00 - 0.04 K/uL    Lymph # CANCELED 1.0 - 4.8 K/uL    Mono # CANCELED 0.3 - 1.0 K/uL    Eos # CANCELED 0.0 - 0.5 K/uL    Baso # CANCELED 0.00 - 0.20 K/uL    nRBC 0 0 /100 WBC    Gran % 78.0 (H) 38.0 - 73.0 %    Lymph % 6.0 (L) 18.0 - 48.0 %    Mono % 4.0 4.0 - 15.0 %    Eosinophil % 0.0 0.0 - 8.0 %    Basophil % 0.0 0.0 - 1.9 %    Bands 10.0 %    Metamyelocytes 2.0 %    Differential Method Manual    Comprehensive metabolic panel   Result Value Ref Range    Sodium 145 136 - 145 mmol/L    Potassium 4.5 3.5 - 5.1 mmol/L    Chloride 118 (H) 95 - 110 mmol/L    CO2 19 (L) 23 - 29 mmol/L    Glucose 100 70 - 110 mg/dL    BUN 73 (H) 8 - 23 mg/dL    Creatinine 3.9 (H) 0.5 - 1.4 mg/dL    Calcium 8.3 (L) 8.7 - 10.5 mg/dL    Total Protein 5.9 (L) 6.0 - 8.4 g/dL    Albumin 2.2 (L) 3.5 - 5.2 g/dL    Total Bilirubin 0.3 0.1 - 1.0 mg/dL    Alkaline Phosphatase 84 55 - 135 U/L    AST 43 (H) 10 - 40 U/L    ALT 38 10 - 44 U/L    Anion Gap 8 8 - 16 mmol/L    eGFR if African American 13.3 (A) >60 mL/min/1.73 m^2    eGFR if non African American 11.5 (A) >60 mL/min/1.73 m^2   Protein / creatinine ratio, urine   Result Value Ref Range    Protein, Urine Random 51.6 (H) 0.0 - 15.0 mg/dL    Creatinine, Urine 83.9 15.0 - 325.0 mg/dL    Prot/Creat Ratio, Urine 0.62 (H) 0.00 - 0.20   Sodium, urine, random   Result Value Ref Range    Sodium, Urine 114 20 - 250 mmol/L   Potassium, urine, random   Result Value Ref Range    Potassium, Urine 22 15 - 95 mmol/L   Chloride, urine, random   Result Value Ref Range    Chloride, Urine 90 25 - 200 mmol/L   Protein Electrophoresis, Random Urine   Result Value Ref Range    Protein Electrophoresis, Ur Random SEE COMMENT    CBC Auto Differential   Result Value Ref Range    WBC 13.08 (H) 3.90 - 12.70 K/uL    RBC  4.75 4.00 - 5.40 M/uL    Hemoglobin 13.7 12.0 - 16.0 g/dL    Hematocrit 40.8 37.0 - 48.5 %    MCV 86 82 - 98 fL    MCH 28.8 27.0 - 31.0 pg    MCHC 33.6 32.0 - 36.0 g/dL    RDW 13.5 11.5 - 14.5 %    Platelets 127 (L) 150 - 450 K/uL    MPV 12.9 9.2 - 12.9 fL    Immature Granulocytes 0.8 (H) 0.0 - 0.5 %    Gran # (ANC) 12.1 (H) 1.8 - 7.7 K/uL    Immature Grans (Abs) 0.10 (H) 0.00 - 0.04 K/uL    Lymph # 0.6 (L) 1.0 - 4.8 K/uL    Mono # 0.3 0.3 - 1.0 K/uL    Eos # 0.0 0.0 - 0.5 K/uL    Baso # 0.02 0.00 - 0.20 K/uL    nRBC 0 0 /100 WBC    Gran % 92.6 (H) 38.0 - 73.0 %    Lymph % 4.4 (L) 18.0 - 48.0 %    Mono % 2.0 (L) 4.0 - 15.0 %    Eosinophil % 0.0 0.0 - 8.0 %    Basophil % 0.2 0.0 - 1.9 %    Differential Method Automated    Comprehensive Metabolic Panel   Result Value Ref Range    Sodium 144 136 - 145 mmol/L    Potassium 4.5 3.5 - 5.1 mmol/L    Chloride 119 (H) 95 - 110 mmol/L    CO2 18 (L) 23 - 29 mmol/L    Glucose 117 (H) 70 - 110 mg/dL    BUN 44 (H) 8 - 23 mg/dL    Creatinine 1.9 (H) 0.5 - 1.4 mg/dL    Calcium 8.9 8.7 - 10.5 mg/dL    Total Protein 6.7 6.0 - 8.4 g/dL    Albumin 2.4 (L) 3.5 - 5.2 g/dL    Total Bilirubin 0.6 0.1 - 1.0 mg/dL    Alkaline Phosphatase 69 55 - 135 U/L    AST 43 (H) 10 - 40 U/L    ALT 42 10 - 44 U/L    Anion Gap 7 (L) 8 - 16 mmol/L    eGFR if  31.7 (A) >60 mL/min/1.73 m^2    eGFR if non African American 27.5 (A) >60 mL/min/1.73 m^2   CBC Auto Differential   Result Value Ref Range    WBC 12.70 3.90 - 12.70 K/uL    RBC 5.32 4.00 - 5.40 M/uL    Hemoglobin 15.3 12.0 - 16.0 g/dL    Hematocrit 44.2 37.0 - 48.5 %    MCV 83 82 - 98 fL    MCH 28.8 27.0 - 31.0 pg    MCHC 34.6 32.0 - 36.0 g/dL    RDW 12.8 11.5 - 14.5 %    Platelets 166 150 - 450 K/uL    MPV 12.2 9.2 - 12.9 fL    Immature Granulocytes 1.9 (H) 0.0 - 0.5 %    Gran # (ANC) 10.9 (H) 1.8 - 7.7 K/uL    Immature Grans (Abs) 0.24 (H) 0.00 - 0.04 K/uL    Lymph # 1.0 1.0 - 4.8 K/uL    Mono # 0.6 0.3 - 1.0 K/uL    Eos # 0.0 0.0 -  0.5 K/uL    Baso # 0.04 0.00 - 0.20 K/uL    nRBC 0 0 /100 WBC    Gran % 85.9 (H) 38.0 - 73.0 %    Lymph % 7.5 (L) 18.0 - 48.0 %    Mono % 4.4 4.0 - 15.0 %    Eosinophil % 0.0 0.0 - 8.0 %    Basophil % 0.3 0.0 - 1.9 %    Differential Method Automated    Comprehensive Metabolic Panel   Result Value Ref Range    Sodium 137 136 - 145 mmol/L    Potassium 3.4 (L) 3.5 - 5.1 mmol/L    Chloride 107 95 - 110 mmol/L    CO2 20 (L) 23 - 29 mmol/L    Glucose 131 (H) 70 - 110 mg/dL    BUN 23 8 - 23 mg/dL    Creatinine 1.1 0.5 - 1.4 mg/dL    Calcium 8.9 8.7 - 10.5 mg/dL    Total Protein 7.4 6.0 - 8.4 g/dL    Albumin 2.6 (L) 3.5 - 5.2 g/dL    Total Bilirubin 1.1 (H) 0.1 - 1.0 mg/dL    Alkaline Phosphatase 82 55 - 135 U/L    AST 58 (H) 10 - 40 U/L    ALT 52 (H) 10 - 44 U/L    Anion Gap 10 8 - 16 mmol/L    eGFR if African American >60.0 >60 mL/min/1.73 m^2    eGFR if non  53.2 (A) >60 mL/min/1.73 m^2   Pathologist Interpretation UPE   Result Value Ref Range    Pathologist Interpretation UPE REVIEWED    POCT COVID-19 Rapid Screening   Result Value Ref Range    POC Rapid COVID Negative Negative     Acceptable Yes        Assessment:     1. Abdominal pain, unspecified abdominal location    2. Nausea        Plan:       Abdominal pain, unspecified abdominal location  -     Refer to Emergency Dept.    Nausea  -     SARS Coronavirus 2 Antigen, POCT Manual Read      As discussed, it is recommended that you present to the ER now for further evaluation to prevent a delay in care.   Offered transport via EMS but patient/family declines Opts for private transport via personal vehicle.

## 2024-11-13 ENCOUNTER — OFFICE VISIT (OUTPATIENT)
Dept: URGENT CARE | Facility: CLINIC | Age: 64
End: 2024-11-13
Payer: MEDICARE

## 2024-11-13 VITALS
BODY MASS INDEX: 14.72 KG/M2 | TEMPERATURE: 98 F | RESPIRATION RATE: 18 BRPM | WEIGHT: 80 LBS | HEIGHT: 62 IN | HEART RATE: 104 BPM | OXYGEN SATURATION: 100 %

## 2024-11-13 DIAGNOSIS — T63.481A INSECT STINGS, ACCIDENTAL OR UNINTENTIONAL, INITIAL ENCOUNTER: Primary | ICD-10-CM

## 2024-11-13 PROCEDURE — 99213 OFFICE O/P EST LOW 20 MIN: CPT | Mod: 25,,, | Performed by: PHYSICIAN ASSISTANT

## 2024-11-13 PROCEDURE — 96372 THER/PROPH/DIAG INJ SC/IM: CPT | Mod: ,,, | Performed by: PHYSICIAN ASSISTANT

## 2024-11-13 RX ORDER — DEXAMETHASONE SODIUM PHOSPHATE 10 MG/ML
10 INJECTION INTRAMUSCULAR; INTRAVENOUS
Status: COMPLETED | OUTPATIENT
Start: 2024-11-13 | End: 2024-11-13

## 2024-11-13 RX ORDER — PREDNISONE 10 MG/1
10 TABLET ORAL 2 TIMES DAILY
Qty: 6 TABLET | Refills: 0 | Status: SHIPPED | OUTPATIENT
Start: 2024-11-13 | End: 2024-11-16

## 2024-11-13 RX ADMIN — DEXAMETHASONE SODIUM PHOSPHATE 10 MG: 10 INJECTION INTRAMUSCULAR; INTRAVENOUS at 12:11

## 2024-11-13 NOTE — PROGRESS NOTES
"Subjective:      Patient ID: Zully Jerry is a 64 y.o. female.    Vitals:  height is 5' 2" (1.575 m) and weight is 36.3 kg (80 lb). Her temperature is 98.1 °F (36.7 °C). Her pulse is 104. Her respiration is 18 and oxygen saturation is 100%.     Chief Complaint: Insect Bite     Patient is a 64 y.o. female who presents to urgent care with complaints of left finger pain with swelling and back pain from an insect sting which happened about 30 minutes ago.       Skin:  Positive for erythema.      Objective:     Physical Exam   HENT:   Head: Normocephalic and atraumatic.   Neck: Neck supple.   Abdominal: Normal appearance.   Neurological: She is alert.   Skin: erythema and lesion   Erythema and mild swelling to the left index finger and posterior aspect of the neck.     The patient is crying acting erratically.       Previous History      Review of patient's allergies indicates:  No Known Allergies    Past Medical History:   Diagnosis Date    [facility]    Degenerative disc disease     cervical spine    GERD (gastroesophageal reflux disease)     Hepatitis C     Genotype 1a, no previous tx, G1S1 on Bx 2/2010    Hyperlipidemia     Hypertension     Migraine headache     Raynaud's disease      Current Outpatient Medications   Medication Instructions    acetaminophen (TYLENOL) 1,000 mg, Oral, Every 8 hours PRN    acyclovir (ZOVIRAX) 800 MG Tab TAKE 1 TABLET 5 TIMES DAILY FOR 7 DAYS    ALPRAZolam (XANAX) 0.25 mg, 3 times daily    ARIPiprazole (ABILIFY) 5 mg, Nightly    buprenorphine HCL (SUBUTEX) 8 mg, 3 times daily    buprenorphine-naloxone 2-0.5 mg (SUBOXONE) 2-0.5 mg Subl 1 tablet, 2 times daily    chlordiazepoxide (LIBRIUM) 10 mg, 2 times daily PRN    ciclopirox (PENLAC) 8 % Soln Apply to affected toenails at night time DAILY. On 7th day, file nails down, clean all nails with alcohol and restart application process.    clindamycin (CLEOCIN) 150 MG capsule TAKE ONE CAPSULE EVERY 6 " HOURS FOR 7 DAYS    diclofenac sodium (VOLTAREN) 2 g, Topical (Top), 4 times daily PRN    DULoxetine (CYMBALTA) 60 mg    FLUoxetine 40 mg, Daily    hydrOXYzine (ATARAX) 50 mg, Nightly    hydrOXYzine pamoate (VISTARIL) 50 mg, 3 times daily PRN    isosorbide mononitrate (IMDUR) 30 mg, Oral, Daily    levothyroxine (SYNTHROID) 25 mcg    LIDOcaine HCl 2% (XYLOCAINE) 2 % Soln 5ml gargle and spit q4 hrs prn sore throat    lisinopriL 10 mg, Daily    lurasidone (LATUDA) 40 mg    meclizine (ANTIVERT) 25 mg, 2 times daily    meloxicam (MOBIC) 7.5 MG tablet No dose, route, or frequency recorded.    metFORMIN (GLUCOPHAGE) 500 mg, 2 times daily with meals    methocarbamoL (ROBAXIN) 500 mg, Daily    naproxen (NAPROSYN) 500 mg, Oral, Every 12 hours PRN    omeprazole (PRILOSEC) 20 MG capsule No dose, route, or frequency recorded.    ondansetron (ZOFRAN) 8 mg, Every 12 hours PRN    ondansetron (ZOFRAN-ODT) 4 mg, Oral, Every 8 hours PRN    ondansetron (ZOFRAN-ODT) 4 mg, Oral, Every 8 hours PRN    OXcarbazepine (TRILEPTAL) 300 mg, 2 times daily    pantoprazole (PROTONIX) 40 mg, Oral, Daily    predniSONE (DELTASONE) 10 mg, Oral, 2 times daily    promethazine (PHENERGAN) 12.5 mg, Oral, Every 6 hours PRN    sertraline (ZOLOFT) 100 mg    sucralfate (CARAFATE) 1 g, Oral, 4 times daily    traMADoL (ULTRAM-ER) 100 mg, Every 6 hours PRN    traZODone (DESYREL) 50 mg, Nightly    valACYclovir (VALTREX) 1000 MG tablet 1 tab po q8 x 7 days prn mouth lesions     Past Surgical History:   Procedure Laterality Date    BACK SURGERY      BREAST SURGERY      lumpectomy    TUBAL LIGATION      one tube and ovary removed for cyst     Family History   Problem Relation Name Age of Onset    Hyperlipidemia Mother      Diabetes Mother      Kidney disease Mother      Heart disease Mother      Heart disease Father      Diabetes Father      Cancer Sister          not sure  but knows its female Ca.    Cancer Paternal Grandmother          uterine Cancer       Social  "History     Tobacco Use    Smoking status: Every Day     Current packs/day: 0.00     Types: Cigarettes     Last attempt to quit: 10/16/2014     Years since quitting: 10.0    Smokeless tobacco: Never   Substance Use Topics    Alcohol use: No     Comment: quit ~10-12 yrs ago; previously heavy use    Drug use: No        Physical Exam      Vital Signs Reviewed   Pulse 104   Temp 98.1 °F (36.7 °C)   Resp 18   Ht 5' 2" (1.575 m)   Wt 36.3 kg (80 lb)   SpO2 100%   BMI 14.63 kg/m²        Procedures    Procedures     Labs     Results for orders placed or performed in visit on 08/26/24   SARS Coronavirus 2 Antigen, POCT Manual Read    Collection Time: 08/26/24  8:18 AM   Result Value Ref Range    SARS Coronavirus 2 Antigen Negative Negative     Acceptable Yes        Assessment:     1. Insect stings, accidental or unintentional, initial encounter        Plan:       Insect stings, accidental or unintentional, initial encounter    Other orders  -     dexAMETHasone injection 10 mg  -     predniSONE (DELTASONE) 10 MG tablet; Take 1 tablet (10 mg total) by mouth 2 (two) times daily. for 3 days  Dispense: 6 tablet; Refill: 0    Take Claritin or Benadryl as directed per package instructions. May cause sedation/drowsiness (safety precautions discussed).  Follow-up with your Primary Care Provider as needed.   Go to the emergency department with any significant change or worsening of symptoms.  Please monitor for any signs of infection such as worsening redness, swelling, pain, purulent discharge, fever, body aches, or chills and seek follow-up care as needed.                  "

## 2024-11-13 NOTE — PATIENT INSTRUCTIONS
Take Claritin or Benadryl as directed per package instructions. May cause sedation/drowsiness (safety precautions discussed).  Follow-up with your Primary Care Provider as needed.   Go to the emergency department with any significant change or worsening of symptoms.  Please monitor for any signs of infection such as worsening redness, swelling, pain, purulent discharge, fever, body aches, or chills and seek follow-up care as needed.      Start prednisone tomorrow morning if needed.      Smoking cessation strongly encouraged.  Assistance offered, information will be provided.  If not ready to quit now, patient will contact this clinic in the future if I can be of any specific help related to the discontinuation of smoking/tobacco use.

## 2024-12-05 DIAGNOSIS — B18.2 CHRONIC HEPATITIS C WITHOUT HEPATIC COMA: Primary | ICD-10-CM

## 2025-01-28 ENCOUNTER — TELEPHONE (OUTPATIENT)
Dept: INFECTIOUS DISEASES | Facility: CLINIC | Age: 65
End: 2025-01-28
Payer: MEDICARE

## 2025-01-28 NOTE — TELEPHONE ENCOUNTER
----- Message from Soheila sent at 1/28/2025  1:29 PM CST -----  New Patient of Racquel Rockwell     Patient has been scheduled for fibro scan and New patient appointment tomorrow at     1/29/2025    Thanks

## 2025-01-29 ENCOUNTER — LAB VISIT (OUTPATIENT)
Dept: LAB | Facility: HOSPITAL | Age: 65
End: 2025-01-29
Attending: NURSE PRACTITIONER
Payer: MEDICARE

## 2025-01-29 ENCOUNTER — OFFICE VISIT (OUTPATIENT)
Dept: INFECTIOUS DISEASES | Facility: CLINIC | Age: 65
End: 2025-01-29
Payer: MEDICARE

## 2025-01-29 VITALS
HEIGHT: 62 IN | WEIGHT: 86 LBS | DIASTOLIC BLOOD PRESSURE: 80 MMHG | SYSTOLIC BLOOD PRESSURE: 114 MMHG | RESPIRATION RATE: 16 BRPM | BODY MASS INDEX: 15.83 KG/M2 | TEMPERATURE: 99 F | HEART RATE: 97 BPM

## 2025-01-29 DIAGNOSIS — B18.2 CHRONIC HEPATITIS C WITHOUT HEPATIC COMA: Primary | ICD-10-CM

## 2025-01-29 DIAGNOSIS — L98.9 SKIN LESIONS: ICD-10-CM

## 2025-01-29 DIAGNOSIS — B18.2 CHRONIC HEPATITIS C WITHOUT HEPATIC COMA: ICD-10-CM

## 2025-01-29 LAB
ALBUMIN SERPL-MCNC: 3.2 G/DL (ref 3.4–4.8)
ALBUMIN/GLOB SERPL: 0.7 RATIO (ref 1.1–2)
ALP SERPL-CCNC: 120 UNIT/L (ref 40–150)
ALT SERPL-CCNC: 88 UNIT/L (ref 0–55)
ANION GAP SERPL CALC-SCNC: 5 MEQ/L
AST SERPL-CCNC: 94 UNIT/L (ref 5–34)
B-HCG UR QL: NEGATIVE
BASOPHILS # BLD AUTO: 0.03 X10(3)/MCL
BASOPHILS NFR BLD AUTO: 0.5 %
BILIRUB SERPL-MCNC: 0.4 MG/DL
BUN SERPL-MCNC: 17.2 MG/DL (ref 9.8–20.1)
CALCIUM SERPL-MCNC: 8.9 MG/DL (ref 8.4–10.2)
CHLORIDE SERPL-SCNC: 106 MMOL/L (ref 98–107)
CO2 SERPL-SCNC: 26 MMOL/L (ref 23–31)
CREAT SERPL-MCNC: 0.94 MG/DL (ref 0.55–1.02)
CREAT/UREA NIT SERPL: 18
EOSINOPHIL # BLD AUTO: 0.02 X10(3)/MCL (ref 0–0.9)
EOSINOPHIL NFR BLD AUTO: 0.3 %
ERYTHROCYTE [DISTWIDTH] IN BLOOD BY AUTOMATED COUNT: 13 % (ref 11.5–17)
FERRITIN SERPL-MCNC: 820.51 NG/ML (ref 4.63–204)
GFR SERPLBLD CREATININE-BSD FMLA CKD-EPI: >60 ML/MIN/1.73/M2
GLOBULIN SER-MCNC: 4.6 GM/DL (ref 2.4–3.5)
GLUCOSE SERPL-MCNC: 180 MG/DL (ref 82–115)
HAV AB SER QL IA: NONREACTIVE
HAV IGM SERPL QL IA: NONREACTIVE
HBV CORE AB SERPL QL IA: NONREACTIVE
HBV SURFACE AB SER-ACNC: 0.47 MIU/ML
HBV SURFACE AB SERPL IA-ACNC: NONREACTIVE M[IU]/ML
HBV SURFACE AG SERPL QL IA: NONREACTIVE
HCT VFR BLD AUTO: 42.7 % (ref 37–47)
HGB BLD-MCNC: 13.9 G/DL (ref 12–16)
HIV 1+2 AB+HIV1 P24 AG SERPL QL IA: NONREACTIVE
IMM GRANULOCYTES # BLD AUTO: 0.01 X10(3)/MCL (ref 0–0.04)
IMM GRANULOCYTES NFR BLD AUTO: 0.2 %
INR PPP: 1.1
LYMPHOCYTES # BLD AUTO: 1.22 X10(3)/MCL (ref 0.6–4.6)
LYMPHOCYTES NFR BLD AUTO: 21 %
MCH RBC QN AUTO: 30.8 PG (ref 27–31)
MCHC RBC AUTO-ENTMCNC: 32.6 G/DL (ref 33–36)
MCV RBC AUTO: 94.5 FL (ref 80–94)
MONOCYTES # BLD AUTO: 0.43 X10(3)/MCL (ref 0.1–1.3)
MONOCYTES NFR BLD AUTO: 7.4 %
NEUTROPHILS # BLD AUTO: 4.1 X10(3)/MCL (ref 2.1–9.2)
NEUTROPHILS NFR BLD AUTO: 70.6 %
NRBC BLD AUTO-RTO: 0 %
PLATELET # BLD AUTO: 139 X10(3)/MCL (ref 130–400)
PLATELETS.RETICULATED NFR BLD AUTO: 9 % (ref 0.9–11.2)
PMV BLD AUTO: 13.5 FL (ref 7.4–10.4)
POTASSIUM SERPL-SCNC: 4.2 MMOL/L (ref 3.5–5.1)
PROT SERPL-MCNC: 7.8 GM/DL (ref 5.8–7.6)
PROTHROMBIN TIME: 13.8 SECONDS (ref 11.4–14)
RBC # BLD AUTO: 4.52 X10(6)/MCL (ref 4.2–5.4)
SODIUM SERPL-SCNC: 137 MMOL/L (ref 136–145)
T PALLIDUM AB SER QL: REACTIVE
WBC # BLD AUTO: 5.81 X10(3)/MCL (ref 4.5–11.5)

## 2025-01-29 PROCEDURE — 86704 HEP B CORE ANTIBODY TOTAL: CPT

## 2025-01-29 PROCEDURE — 86708 HEPATITIS A ANTIBODY: CPT

## 2025-01-29 PROCEDURE — 87340 HEPATITIS B SURFACE AG IA: CPT

## 2025-01-29 PROCEDURE — 99203 OFFICE O/P NEW LOW 30 MIN: CPT | Mod: S$PBB,,, | Performed by: NURSE PRACTITIONER

## 2025-01-29 PROCEDURE — 3008F BODY MASS INDEX DOCD: CPT | Mod: CPTII,,, | Performed by: NURSE PRACTITIONER

## 2025-01-29 PROCEDURE — 85610 PROTHROMBIN TIME: CPT

## 2025-01-29 PROCEDURE — 86039 ANTINUCLEAR ANTIBODIES (ANA): CPT

## 2025-01-29 PROCEDURE — 99215 OFFICE O/P EST HI 40 MIN: CPT | Mod: PBBFAC | Performed by: NURSE PRACTITIONER

## 2025-01-29 PROCEDURE — 82728 ASSAY OF FERRITIN: CPT

## 2025-01-29 PROCEDURE — 80053 COMPREHEN METABOLIC PANEL: CPT

## 2025-01-29 PROCEDURE — 86709 HEPATITIS A IGM ANTIBODY: CPT

## 2025-01-29 PROCEDURE — 87522 HEPATITIS C REVRS TRNSCRPJ: CPT

## 2025-01-29 PROCEDURE — 1101F PT FALLS ASSESS-DOCD LE1/YR: CPT | Mod: CPTII,,, | Performed by: NURSE PRACTITIONER

## 2025-01-29 PROCEDURE — 3074F SYST BP LT 130 MM HG: CPT | Mod: CPTII,,, | Performed by: NURSE PRACTITIONER

## 2025-01-29 PROCEDURE — 1126F AMNT PAIN NOTED NONE PRSNT: CPT | Mod: CPTII,,, | Performed by: NURSE PRACTITIONER

## 2025-01-29 PROCEDURE — 81025 URINE PREGNANCY TEST: CPT

## 2025-01-29 PROCEDURE — 1159F MED LIST DOCD IN RCRD: CPT | Mod: CPTII,,, | Performed by: NURSE PRACTITIONER

## 2025-01-29 PROCEDURE — 86780 TREPONEMA PALLIDUM: CPT

## 2025-01-29 PROCEDURE — 87902 NFCT AGT GNTYP ALYS HEP C: CPT

## 2025-01-29 PROCEDURE — 85025 COMPLETE CBC W/AUTO DIFF WBC: CPT

## 2025-01-29 PROCEDURE — 81596 NFCT DS CHRNC HCV 6 ASSAYS: CPT

## 2025-01-29 PROCEDURE — 87389 HIV-1 AG W/HIV-1&-2 AB AG IA: CPT

## 2025-01-29 PROCEDURE — 86706 HEP B SURFACE ANTIBODY: CPT

## 2025-01-29 PROCEDURE — 36415 COLL VENOUS BLD VENIPUNCTURE: CPT

## 2025-01-29 PROCEDURE — 3079F DIAST BP 80-89 MM HG: CPT | Mod: CPTII,,, | Performed by: NURSE PRACTITIONER

## 2025-01-29 PROCEDURE — 3288F FALL RISK ASSESSMENT DOCD: CPT | Mod: CPTII,,, | Performed by: NURSE PRACTITIONER

## 2025-01-29 PROCEDURE — 86592 SYPHILIS TEST NON-TREP QUAL: CPT

## 2025-01-29 RX ORDER — KETOCONAZOLE 20 MG/ML
SHAMPOO, SUSPENSION TOPICAL EVERY OTHER DAY
COMMUNITY
Start: 2025-01-03

## 2025-01-29 RX ORDER — DEXTROAMPHETAMINE SACCHARATE, AMPHETAMINE ASPARTATE, DEXTROAMPHETAMINE SULFATE AND AMPHETAMINE SULFATE 2.5; 2.5; 2.5; 2.5 MG/1; MG/1; MG/1; MG/1
1 TABLET ORAL 2 TIMES DAILY
COMMUNITY

## 2025-01-29 RX ORDER — FLUOCINONIDE TOPICAL SOLUTION USP, 0.05% 0.5 MG/ML
SOLUTION TOPICAL
COMMUNITY
Start: 2025-01-06

## 2025-01-29 RX ORDER — KETOROLAC TROMETHAMINE 10 MG/1
10 TABLET, FILM COATED ORAL EVERY 6 HOURS
COMMUNITY
Start: 2024-09-24

## 2025-01-29 RX ORDER — MUPIROCIN 20 MG/G
OINTMENT TOPICAL 2 TIMES DAILY
Qty: 30 G | Refills: 0 | Status: SHIPPED | OUTPATIENT
Start: 2025-01-29 | End: 2025-01-29 | Stop reason: SDUPTHER

## 2025-01-29 RX ORDER — DUPILUMAB 300 MG/2ML
INJECTION, SOLUTION SUBCUTANEOUS
COMMUNITY
Start: 2025-01-17

## 2025-01-29 RX ORDER — IVERMECTIN 3 MG/1
TABLET ORAL
COMMUNITY
Start: 2024-12-11 | End: 2025-01-29

## 2025-01-29 RX ORDER — MUPIROCIN 20 MG/G
OINTMENT TOPICAL 2 TIMES DAILY
Qty: 30 G | Refills: 0 | Status: SHIPPED | OUTPATIENT
Start: 2025-01-29

## 2025-01-29 RX ORDER — PERMETHRIN 50 MG/G
CREAM TOPICAL
COMMUNITY
Start: 2024-12-06 | End: 2025-01-29

## 2025-01-29 NOTE — LETTER
January 29, 2025            [facility] [address] [address]  Phone: [phone]       Patient: Zully Jerry   YOB: 1960  Date of Visit: 01/29/2025    To Whom It May Concern:    Zully Jerry  was at [facility] on 01/29/2025.  She may return to work on 01/30/25 with no restrictions. If you have any questions or concerns, or if I can be of further assistance, please do not hesitate to contact me.    Sincerely,     KELLIE Neely

## 2025-01-29 NOTE — PROGRESS NOTES
Patient ID: Zully Jerry 64 y.o.     Chief Complaint:   Chief Complaint   Patient presents with    Referral     Hep C        HPI:    Zully Jerry is a 63 y/o WF here for initial Hep C visit. Pt was referred to clinic by Dr. Dixon on 12/5/24 for a positive Hep C antibody in 2009.  Referral labs from 12/28/09 show Hep C VL [id number], Gt1a. Pt endorses she was dx about 30 years ago, but never sought treatment.  Hx of IVDU in the past. She states it has been years since she has used. She admits to THC and alcohol infrequently.  Denies tatoos and blood transfusion.  Co-morbidities include bipolar disorder, chronic hepatitis C, and hx of pancreatitis. Pt denies fever, headache, chills, visual problems, icterus, jaundice, N/V/D, esophageal varices, SOB, cough, abd pain, ascites or caron colored stools. She complains of parasites to her face that make her itch all over. She states she saw the dermatologist who could not help her. She was treated with Elimite and Hydroxyzine which did not help. Pt does not have skin lesions anywhere on her body today except for the face which she is picking and rubbing.  She endorses that she did not sleep well last night and tells me her bipolar has been out of control. Pt sees Dr. Ramo Hawk at [facility]. I encouraged her to reach out to him for further evaluation. She denies SI or HI.  Pt states this particular outbreak occurred last week. She tried to cover it up with makeup to go to work and it got worse.           Past Medical History:   Diagnosis Date    Bipolar affective     [facility]    Degenerative disc disease     cervical spine    GERD (gastroesophageal reflux disease)     Hepatitis C     Genotype 1a, no previous tx, G1S1 on Bx 2/2010    Hyperlipidemia     Hypertension     Migraine headache     Raynaud's disease         Past Surgical History:   Procedure Laterality Date    BACK SURGERY      BREAST SURGERY      lumpectomy    TUBAL LIGATION      one tube and  ovary removed for cyst        Social History     Socioeconomic History    Marital status:     Number of children: 3   Occupational History    Occupation: [employer]     Employer: [employer]   Tobacco Use    Smoking status: Every Day     Current packs/day: 0.00     Types: Cigarettes     Last attempt to quit: 10/16/2014     Years since quitting: 10.2    Smokeless tobacco: Never   Substance and Sexual Activity    Alcohol use: No     Comment: quit ~10-12 yrs ago; previously heavy use    Drug use: No    Sexual activity: Yes     Partners: Male     Birth control/protection: Post-menopausal        Family History   Problem Relation Name Age of Onset    Hyperlipidemia Mother      Diabetes Mother      Kidney disease Mother      Heart disease Mother      Heart disease Father      Diabetes Father      Cancer Sister          not sure  but knows its female Ca.    Cancer Paternal Grandmother          uterine Cancer        Review of patient's allergies indicates:  No Known Allergies     Immunization History   Administered Date(s) Administered    COVID-19, MRNA, LN-S, PF (Pfizer) (Purple Cap) 07/06/2021    Hepatitis A, Adult 01/05/2010    Hepatitis B, Adult 01/05/2010    Influenza - Quadrivalent 10/12/2017    Influenza - Quadrivalent - PF *Preferred* (6 months and older) 09/09/2014, 10/12/2017    Influenza - Trivalent - Afluria, Fluzone MDV 01/06/2010, 11/07/2013    Influenza - Trivalent - Fluarix, Flulaval, Fluzone, Afluria - PF 09/09/2014    Influenza A (H1N1) 2009 Monovalent - IM 01/06/2010    Influenza Split 01/06/2010, 11/07/2013    Tdap 02/11/2019, 12/24/2020        Review of Systems   Constitutional: Negative.    HENT: Negative.     Eyes: Negative.    Respiratory: Negative.     Cardiovascular: Negative.    Gastrointestinal: Negative.    Genitourinary: Negative.    Musculoskeletal: Negative.    Skin: Negative.    Neurological: Negative.    Endo/Heme/Allergies: Negative.    Psychiatric/Behavioral:  "Negative.     All other systems reviewed and are negative.         Objective:      /80   Pulse 97   Temp 98.5 °F (36.9 °C) (Oral)   Resp 16   Ht 5' 2" (1.575 m)   Wt 39 kg (86 lb)   BMI 15.73 kg/m²      Physical Exam  Vitals reviewed.   Constitutional:       General: She is not in acute distress.     Appearance: Normal appearance.   HENT:      Head: Normocephalic.      Right Ear: External ear normal.      Left Ear: External ear normal.      Nose: Nose normal.      Mouth/Throat:      Mouth: Mucous membranes are moist.      Pharynx: Oropharynx is clear.   Eyes:      General: No scleral icterus.     Extraocular Movements: Extraocular movements intact.      Conjunctiva/sclera: Conjunctivae normal.      Pupils: Pupils are equal, round, and reactive to light.   Cardiovascular:      Rate and Rhythm: Normal rate and regular rhythm.      Pulses: Normal pulses.      Heart sounds: Normal heart sounds.   Pulmonary:      Effort: Pulmonary effort is normal. No respiratory distress.      Breath sounds: Normal breath sounds.   Abdominal:      General: Bowel sounds are normal. There is no distension.      Palpations: Abdomen is soft. There is no mass.      Tenderness: There is no abdominal tenderness. There is no right CVA tenderness or left CVA tenderness.      Hernia: No hernia is present.   Musculoskeletal:         General: No tenderness or signs of injury. Normal range of motion.      Cervical back: Normal range of motion and neck supple.      Right lower leg: No edema.      Left lower leg: No edema.   Lymphadenopathy:      Cervical: No cervical adenopathy.   Skin:     General: Skin is warm and dry.      Capillary Refill: Capillary refill takes less than 2 seconds.      Findings: No erythema or lesion.   Neurological:      General: No focal deficit present.      Mental Status: She is alert and oriented to person, place, and time. Mental status is at baseline.   Psychiatric:         Mood and Affect: Mood normal.       "   Behavior: Behavior normal.         Thought Content: Thought content normal.         Judgment: Judgment normal.          Labs:   Lab Results   Component Value Date    WBC 12.70 04/19/2022    HGB 15.3 04/19/2022    HCT 44.2 04/19/2022    MCV 83 04/19/2022     04/19/2022       CMP  Sodium   Date Value Ref Range Status   04/19/2022 137 136 - 145 mmol/L Final     Potassium   Date Value Ref Range Status   04/19/2022 3.4 (L) 3.5 - 5.1 mmol/L Final     Chloride   Date Value Ref Range Status   04/19/2022 107 95 - 110 mmol/L Final     CO2   Date Value Ref Range Status   04/19/2022 20 (L) 23 - 29 mmol/L Final     Glucose   Date Value Ref Range Status   04/19/2022 131 (H) 70 - 110 mg/dL Final     BUN   Date Value Ref Range Status   04/19/2022 23 8 - 23 mg/dL Final     Creatinine   Date Value Ref Range Status   04/19/2022 1.1 0.5 - 1.4 mg/dL Final     Calcium   Date Value Ref Range Status   04/19/2022 8.9 8.7 - 10.5 mg/dL Final     Total Protein   Date Value Ref Range Status   04/19/2022 7.4 6.0 - 8.4 g/dL Final     Albumin   Date Value Ref Range Status   04/19/2022 2.6 (L) 3.5 - 5.2 g/dL Final     Total Bilirubin   Date Value Ref Range Status   04/19/2022 1.1 (H) 0.1 - 1.0 mg/dL Final     Comment:     For infants and newborns, interpretation of results should be based  on gestational age, weight and in agreement with clinical  observations.    Premature Infant recommended reference ranges:  Up to 24 hours.............<8.0 mg/dL  Up to 48 hours............<12.0 mg/dL  3-5 days..................<15.0 mg/dL  6-29 days.................<15.0 mg/dL    For patients on Eltrombopag therapy, use of Dimension [place] TBIL is   not   recommended.       Alkaline Phosphatase   Date Value Ref Range Status   04/19/2022 82 55 - 135 U/L Final     AST   Date Value Ref Range Status   04/19/2022 58 (H) 10 - 40 U/L Final     ALT   Date Value Ref Range Status   04/19/2022 52 (H) 10 - 44 U/L Final     Anion Gap   Date Value Ref Range Status  "  04/19/2022 10 8 - 16 mmol/L Final     Lab Results   Component Value Date    TSH 0.560 11/07/2013     INR   Date Value Ref Range Status   01/25/2010 0.9 0.8 - 1.2 Final     Comment:     ACCP Guideline for Coumadin usage recommends a "target range" of  2.0 - 3.0 for INR for all indicators except mechanical heart valves  and antiphospholipid syndromes which should use 2.5 - 3.5.  .       Hep B S Ab   Date Value Ref Range Status   11/25/2013 Negative  Final     Hep B Core Total Ab   Date Value Ref Range Status   11/25/2013 Negative  Final     Hepatitis A Antibody IgG   Date Value Ref Range Status   11/25/2013 Negative  Final     Ferritin   Date Value Ref Range Status   01/07/2010 28 20 - 300 ng/ml Final       Imaging: Reviewed most recent relevant imaging studies available, notable results highlighted in this note      Medications:     Current Outpatient Medications   Medication Instructions    acetaminophen (TYLENOL) 1,000 mg, Oral, Every 8 hours PRN    acyclovir (ZOVIRAX) 800 MG Tab     ALPRAZolam (XANAX) 0.25 mg, 3 times daily    ARIPiprazole (ABILIFY) 5 mg, Nightly    buprenorphine HCL (SUBUTEX) 8 mg, 3 times daily    buprenorphine-naloxone 2-0.5 mg (SUBOXONE) 2-0.5 mg Subl 1 tablet, 2 times daily    dextroamphetamine-amphetamine 10 mg Tab 1 tablet, 2 times daily    DULoxetine (CYMBALTA) 60 mg    DUPIXENT SYRINGE 300 mg/2 mL Syrg     fluocinonide (LIDEX) 0.05 % external solution apply TO SCALP as a leave-on treatment TWICE DAILY FOR UP TO TWO WEEKS THEN AS NEEDED FOR ITCHING    hydrOXYzine pamoate (VISTARIL) 50 mg, 3 times daily PRN    isosorbide mononitrate (IMDUR) 30 mg, Oral, Daily    ketoconazole (NIZORAL) 2 % shampoo Every other day    ketorolac (TORADOL) 10 mg, Every 6 hours    levothyroxine (SYNTHROID) 25 mcg    lisinopriL 10 mg, Daily    meloxicam (MOBIC) 7.5 MG tablet No dose, route, or frequency recorded.    mupirocin (BACTROBAN) 2 % ointment Topical (Top), 2 times daily    omeprazole (PRILOSEC) 20 MG " capsule No dose, route, or frequency recorded.    pantoprazole (PROTONIX) 40 mg, Oral, Daily    sertraline (ZOLOFT) 100 mg    traZODone (DESYREL) 50 mg, Nightly    valACYclovir (VALTREX) 1000 MG tablet 1 tab po q8 x 7 days prn mouth lesions       Assessment:       Problem List Items Addressed This Visit          GI    Hepatitis C - Primary    Relevant Orders    DIANE IgG by IFA    CBC Auto Differential    Comprehensive Metabolic Panel    Ferritin    Fibrotest-Actitest, Serum    Hepatitis A antibody, IgG    Hepatitis A Antibody, IgM    Hepatitis B Core Antibody, Total    Hepatitis B Surface Ab, Qualitative    Hepatitis B Surface Antigen    Hepatitis C Genotype    Hepatitis C Virus Quantitative    HIV 1/2 Ag/Ab (4th Gen)    Pregnancy, urine rapid    Protime-INR    SYPHILIS ANTIBODY (WITH REFLEX RPR)    US Abdomen Limited    US Elastography Liver w/imaging     Other Visit Diagnoses       Skin lesions        Relevant Medications    mupirocin (BACTROBAN) 2 % ointment               Plan:      Chronic hepatitis C without hepatic coma  -     Ambulatory referral/consult to Infectious Disease  -     DIANE IgG by IFA; Future; Expected date: 01/29/2025  -     CBC Auto Differential; Future; Expected date: 01/29/2025  -     Comprehensive Metabolic Panel; Future; Expected date: 01/29/2025  -     Ferritin; Future; Expected date: 01/29/2025  -     Fibrotest-Actitest, Serum; Future; Expected date: 01/29/2025  -     Hepatitis A antibody, IgG; Future; Expected date: 01/29/2025  -     Hepatitis A Antibody, IgM; Future; Expected date: 01/29/2025  -     Hepatitis B Core Antibody, Total; Future; Expected date: 01/29/2025  -     Hepatitis B Surface Ab, Qualitative; Future; Expected date: 01/29/2025  -     Hepatitis B Surface Antigen; Future; Expected date: 01/29/2025  -     Hepatitis C Genotype; Future; Expected date: 01/29/2025  -     Hepatitis C Virus Quantitative; Future; Expected date: 01/29/2025  -     HIV 1/2 Ag/Ab (4th Gen); Future;  Expected date: 01/29/2025  -     Pregnancy, urine rapid; Future; Expected date: 01/29/2025  -     Protime-INR; Future; Expected date: 01/29/2025  -     SYPHILIS ANTIBODY (WITH REFLEX RPR); Future; Expected date: 01/29/2025  -     US Abdomen Limited; Future; Expected date: 01/29/2025  -     US Elastography Liver w/imaging; Future; Expected date: 01/29/2025  Diagnosed >30 years ago  Treatment naive  GT 1a, baseline VL [id number] 12/28/09  Hep C disease process discussed extensively with the patient  Pt encouraged to refrain from alcohol and illicit drug use.  Blood and sex precautions discussed. Do not share a needle, razor, nail clippers, toothbrush, or drug paraphernalia with anyone.  No Tylenol at doses greater than 2000 mg per day.    RTC 2 weeks with Racquel. Schedule US prior to appt and labs will need to be collected today.      Skin lesions  -     Discontinue: mupirocin (BACTROBAN) 2 % ointment; Apply topically 2 (two) times daily.  Dispense: 30 g; Refill: 0  -     mupirocin (BACTROBAN) 2 % ointment; Apply topically 2 (two) times daily.  Dispense: 30 g; Refill: 0  Keep skin clean and dry  Stop picking at skin   Apply Bactroban ointment BID using a Q tip and not reusing a Q tip between lesions. Pt encouraged to use in the nares as well.    Encouraged her to throw away all makeup, sponges, etc that could cause infection        35 minutes of total time spent on the encounter, which includes face to face time and non-face to face time preparing to see the patient (eg, review of tests), Obtaining and/or reviewing separately obtained history, Documenting clinical information in the electronic or other health record, Independently interpreting results (not separately reported) and communicating results to the patient/family/caregiver, or Care coordination (not separately reported).

## 2025-01-30 ENCOUNTER — TELEPHONE (OUTPATIENT)
Dept: INFECTIOUS DISEASES | Facility: HOSPITAL | Age: 65
End: 2025-01-30
Payer: MEDICARE

## 2025-01-30 LAB
RPR SER QL: REACTIVE
RPR SER-TITR: ABNORMAL {TITER}

## 2025-01-30 NOTE — TELEPHONE ENCOUNTER
Reviewing labs for Racquel ISABEL as she is out   Patient is noted with a positive syphilis antibody with a RPR at [address].  Please call and inform her that she has syphilis.  Treatment will require Bicillin 2.4 million units IM weekly x3.    Please schedule patient for shot clinic and propose orders on upon arrival  Please obtain syphilis history from office of public Health on patient   Please stress sexual precautions, along with partner testing/treatment   Thank you

## 2025-01-31 LAB
ANA SER QL HEP2 SUBST: NORMAL
HCV RNA SERPL NAA+PROBE-ACNC: ABNORMAL IU/ML

## 2025-01-31 NOTE — TELEPHONE ENCOUNTER
Left message for pt to return call.    Called Health Unit ([facility])   08/24/90- VDRL 1:64 received Bicillin 2.4 MU  11/10/21 VDRL 1:2  01/29/25 RPR 32

## 2025-01-31 NOTE — TELEPHONE ENCOUNTER
Patient informed of results and providers recommendations. All questions answered. Patient verbalized understanding.       PAR  Please place pt on schedule for :  02/03/25 Bicillin #1  02/10/25 Bicillin #2  02/17/25 Bicillin #3

## 2025-02-03 ENCOUNTER — CLINICAL SUPPORT (OUTPATIENT)
Dept: INFECTIOUS DISEASES | Facility: CLINIC | Age: 65
End: 2025-02-03
Attending: NURSE PRACTITIONER
Payer: MEDICARE

## 2025-02-03 ENCOUNTER — HOSPITAL ENCOUNTER (OUTPATIENT)
Dept: RADIOLOGY | Facility: HOSPITAL | Age: 65
Discharge: HOME | End: 2025-02-03
Attending: NURSE PRACTITIONER
Payer: MEDICARE

## 2025-02-03 DIAGNOSIS — B18.2 CHRONIC HEPATITIS C WITHOUT HEPATIC COMA: ICD-10-CM

## 2025-02-03 DIAGNOSIS — A53.9 SYPHILIS: Primary | ICD-10-CM

## 2025-02-03 DIAGNOSIS — A53.9 SYPHILIS: ICD-10-CM

## 2025-02-03 LAB
A2 MACROGLOB SERPL-MCNC: 411 MG/DL (ref 100–280)
ALT SERPL W P-5'-P-CCNC: 89 U/L (ref 7–45)
ANNOTATION COMMENT IMP: ABNORMAL
APO A-I SERPL-MCNC: 126 MG/DL
BILIRUB SERPL-MCNC: 0.3 MG/DL (ref 0–1.2)
FIBROSIS STAGE SERPL QL: ABNORMAL
GGT SERPL-CCNC: 213 U/L (ref 5–36)
HAPTOGLOB SERPL NEPH-MCNC: 51 MG/DL (ref 30–200)
HCV GENTYP SERPL NAA+PROBE: ABNORMAL
LIVER FIBR SCORE SERPL CALC.FIBROSURE: 0.83
LIVER FIBROSIS INTERPRETATION SER-IMP: ABNORMAL
NECROINFLAMMATORY ACT GRADE SERPL QL: ABNORMAL
NECROINFLAMMATORY ACT SCORE SERPL: 0.71
NECROINFLAMMATORY ACTIV INTERP SER-IMP: ABNORMAL
SERIAL #: ABNORMAL

## 2025-02-03 PROCEDURE — 76705 ECHO EXAM OF ABDOMEN: CPT | Mod: TC

## 2025-02-03 PROCEDURE — 96372 THER/PROPH/DIAG INJ SC/IM: CPT | Mod: PBBFAC

## 2025-02-03 RX ADMIN — PENICILLIN G BENZATHINE 2.4 MILLION UNITS: 1200000 INJECTION, SUSPENSION INTRAMUSCULAR at 09:02

## 2025-02-03 NOTE — PROGRESS NOTES
Gave first dose of Bicillin LA 1.2 X 2 injections. Also gave her the number for [facility] in [place] per patients request to see someone for parasites.

## 2025-02-13 ENCOUNTER — TELEPHONE (OUTPATIENT)
Dept: INFECTIOUS DISEASES | Facility: CLINIC | Age: 65
End: 2025-02-13
Payer: MEDICARE

## 2025-02-13 NOTE — TELEPHONE ENCOUNTER
[facility] [phone] spoke with Rosanna and informed of appt today, she will have pt call to reschedule. Rosanna stated pt will be dc'd on Monday in time for nurse visit.

## 2025-02-13 NOTE — TELEPHONE ENCOUNTER
----- Message from Harpal sent at 2/13/2025  9:52 AM CST -----  Patient of Racquel    Called to confirm nurse visit for Monday.  Spoke with Caden who stated patient is at [facility] at this time, but wants to keep appt for Monday for now.    [phone]  9:55  Thanks

## 2025-02-18 ENCOUNTER — TELEPHONE (OUTPATIENT)
Dept: INFECTIOUS DISEASES | Facility: CLINIC | Age: 65
End: 2025-02-18
Payer: MEDICARE

## 2025-02-18 NOTE — TELEPHONE ENCOUNTER
Attempted to contact pt to reschedule nurse visit/provider visit, no answer, left message for return call

## 2025-03-05 ENCOUNTER — TELEPHONE (OUTPATIENT)
Dept: INFECTIOUS DISEASES | Facility: CLINIC | Age: 65
End: 2025-03-05
Payer: MEDICARE

## 2025-03-05 NOTE — TELEPHONE ENCOUNTER
Attempted to contact pt to reschedule f/u and bicillin injections, no answer on primary number, called sisters number who stated pt was not available but she would give pt message to return my call

## 2025-03-05 NOTE — TELEPHONE ENCOUNTER
Return call received from pts sister saying Zully was in a facility and will be discharged tomorrow. Appt has been scheduled with Racquel for 3/7/2025

## 2025-03-07 ENCOUNTER — OFFICE VISIT (OUTPATIENT)
Dept: INFECTIOUS DISEASES | Facility: CLINIC | Age: 65
End: 2025-03-07
Payer: MEDICARE

## 2025-03-07 VITALS
WEIGHT: 86 LBS | HEIGHT: 62 IN | SYSTOLIC BLOOD PRESSURE: 132 MMHG | DIASTOLIC BLOOD PRESSURE: 84 MMHG | HEART RATE: 85 BPM | BODY MASS INDEX: 15.83 KG/M2 | TEMPERATURE: 98 F | RESPIRATION RATE: 14 BRPM

## 2025-03-07 DIAGNOSIS — B18.2 CHRONIC HEPATITIS C WITHOUT HEPATIC COMA: Primary | ICD-10-CM

## 2025-03-07 DIAGNOSIS — A53.0 LATENT SYPHILIS: ICD-10-CM

## 2025-03-07 DIAGNOSIS — F17.210 CIGARETTE NICOTINE DEPENDENCE WITHOUT COMPLICATION: ICD-10-CM

## 2025-03-07 PROCEDURE — 99215 OFFICE O/P EST HI 40 MIN: CPT | Mod: PBBFAC | Performed by: NURSE PRACTITIONER

## 2025-03-07 RX ORDER — OXCARBAZEPINE 300 MG/1
TABLET, FILM COATED ORAL
COMMUNITY

## 2025-03-07 RX ORDER — MIRTAZAPINE 15 MG/1
7.5 TABLET, FILM COATED ORAL NIGHTLY
COMMUNITY
Start: 2025-02-18

## 2025-03-07 RX ORDER — LISINOPRIL 40 MG/1
TABLET ORAL
COMMUNITY

## 2025-03-07 RX ORDER — ALPRAZOLAM 0.5 MG/1
0.5 TABLET ORAL
COMMUNITY

## 2025-03-07 RX ORDER — ARIPIPRAZOLE 10 MG/1
10 TABLET ORAL NIGHTLY
COMMUNITY
Start: 2025-02-18

## 2025-03-07 RX ORDER — ONDANSETRON 8 MG/1
8 TABLET, ORALLY DISINTEGRATING ORAL
COMMUNITY

## 2025-03-07 RX ORDER — GLECAPREVIR AND PIBRENTASVIR 40; 100 MG/1; MG/1
3 TABLET, FILM COATED ORAL DAILY
Qty: 84 TABLET | Refills: 1 | Status: SHIPPED | OUTPATIENT
Start: 2025-03-07

## 2025-03-07 NOTE — LETTER
March 7, 2025          [facility] [address] [address]  Phone: [phone]       Patient: Zully Jerry   YOB: 1960  Date of Visit: 03/07/2025    To Whom It May Concern:    Zully Jerry  was at [facility] on 03/07/2025. She may return to work on 03/07/25 with no restrictions. If you have any questions or concerns, or if I can be of further assistance, please do not hesitate to contact me.    Sincerely,  KELLIE Neely

## 2025-03-07 NOTE — PROGRESS NOTES
Patient ID: Zully Jerry 65 y.o.     Chief Complaint:   Chief Complaint   Patient presents with    Follow-up     Hep C        HPI:    3/7/25  Zully Jerry is a 64 y/o WF here for Hep C f/u. She was recently discharged from [facility] where she stayed for 30 days due to depression. Pt is tearful today, but states she is doing better. Denies HI or SI. She is very eager to start Hep C treatment. Dx 2009.  Hx of IVDU in the past. States she still uses cocaine and THC infrequently.  Denies alcohol use. Pt is treatment naive. Reviewed pretreatment labs collected 1/29/25 Hep C VL 3,110,000, Gt1a, F4, AST 94, ALK 88, RPR 32 dils, HIV neg, DIANE neg, Hep A IGG NR, Hep B s ab NR. Liver US from 2/3/25 showed changes of cirrhosis.  Pt was noted to have latent syphilis with this blood work. She recd Bicillin LA 2.4 million units x 1 2/3/25, but did not receive the other two injections. This will need to be restarted. Pt endorses the rash on her face cleared with one round of Bicillin. She denies fever, headache, chills, visual problems, icterus, jaundice, N/V/D, esophageal varices, SOB, cough, abd pain, ascites or caron colored stools.  Reviewed meds. Pt is on Trileptal so she will need to be treated with Mavyret. She is agreeable. Pt smokes cigarettes daily.    1/29/25  Zully Jerry is a 63 y/o WF here for initial Hep C visit. Pt was referred to clinic by Dr. Dixon on 12/5/24 for a positive Hep C antibody in 2009.  Referral labs from 12/28/09 show Hep C VL 12,639,253, Gt1a. Pt endorses she was dx about 30 years ago, but never sought treatment.  Hx of IVDU in the past. She states it has been years since she has used. She admits to THC and alcohol infrequently.  Denies tatoos and blood transfusion.  Co-morbidities include bipolar disorder, chronic hepatitis C, and hx of pancreatitis. Pt denies fever, headache, chills, visual problems, icterus, jaundice, N/V/D, esophageal varices, SOB, cough, abd pain, ascites or  caron colored stools. She complains of parasites to her face that make her itch all over. She states she saw the dermatologist who could not help her. She was treated with Elimite and Hydroxyzine which did not help. Pt does not have skin lesions anywhere on her body today except for the face which she is picking and rubbing.  She endorses that she did not sleep well last night and tells me her bipolar has been out of control. Pt sees Dr. Ramo Hawk at [facility]. I encouraged her to reach out to him for further evaluation. She denies SI or HI.  Pt states this particular outbreak occurred last week. She tried to cover it up with makeup to go to work and it got worse.           Past Medical History:   Diagnosis Date    Bipolar affective     [facility]    Degenerative disc disease     cervical spine    GERD (gastroesophageal reflux disease)     Hepatitis C     Genotype 1a, no previous tx, G1S1 on Bx 2/2010    Hyperlipidemia     Hypertension     Migraine headache     Raynaud's disease         Past Surgical History:   Procedure Laterality Date    BACK SURGERY      BREAST SURGERY      lumpectomy    TUBAL LIGATION      one tube and ovary removed for cyst        Social History     Socioeconomic History    Marital status:     Number of children: 3   Occupational History    Occupation: [employer]     Employer: [employer]   Tobacco Use    Smoking status: Every Day     Current packs/day: 0.00     Types: Cigarettes     Last attempt to quit: 10/16/2014     Years since quitting: 10.3    Smokeless tobacco: Never   Substance and Sexual Activity    Alcohol use: No     Comment: quit ~10-12 yrs ago; previously heavy use    Drug use: No    Sexual activity: Yes     Partners: Male     Birth control/protection: Post-menopausal        Family History   Problem Relation Name Age of Onset    Hyperlipidemia Mother      Diabetes Mother      Kidney disease Mother      Heart disease Mother      Heart  "disease Father      Diabetes Father      Cancer Sister          not sure  but knows its female Ca.    Cancer Paternal Grandmother          uterine Cancer        Review of patient's allergies indicates:  No Known Allergies     Immunization History   Administered Date(s) Administered    COVID-19, MRNA, LN-S, PF (Pfizer) (Purple Cap) 07/06/2021    Hepatitis A, Adult 01/05/2010    Hepatitis B, Adult 01/05/2010    Influenza - Quadrivalent 10/12/2017    Influenza - Quadrivalent - PF *Preferred* (6 months and older) 09/09/2014, 10/12/2017    Influenza - Trivalent - Afluria, Fluzone MDV 01/06/2010, 11/07/2013    Influenza - Trivalent - Fluarix, Flulaval, Fluzone, Afluria - PF 09/09/2014    Influenza A (H1N1) 2009 Monovalent - IM 01/06/2010    Influenza Split 01/06/2010, 11/07/2013    Tdap 02/11/2019, 12/24/2020        Review of Systems   Constitutional: Negative.    HENT: Negative.     Eyes: Negative.    Respiratory: Negative.     Cardiovascular: Negative.    Gastrointestinal: Negative.    Genitourinary: Negative.    Musculoskeletal: Negative.    Skin: Negative.    Neurological: Negative.    Endo/Heme/Allergies: Negative.    Psychiatric/Behavioral:  Positive for depression. Negative for substance abuse and suicidal ideas.    All other systems reviewed and are negative.         Objective:      /84   Pulse 85   Temp 97.5 °F (36.4 °C) (Oral)   Resp 14   Ht 5' 2" (1.575 m)   Wt 39 kg (85 lb 15.7 oz)   BMI 15.73 kg/m²      Physical Exam  Vitals reviewed.   Constitutional:       General: She is not in acute distress.     Appearance: Normal appearance.   HENT:      Head: Normocephalic.      Right Ear: External ear normal.      Left Ear: External ear normal.      Nose: Nose normal.      Mouth/Throat:      Mouth: Mucous membranes are moist.      Pharynx: Oropharynx is clear.   Eyes:      General: No scleral icterus.     Extraocular Movements: Extraocular movements intact.      Conjunctiva/sclera: Conjunctivae normal.     "  Pupils: Pupils are equal, round, and reactive to light.   Cardiovascular:      Rate and Rhythm: Normal rate and regular rhythm.      Pulses: Normal pulses.      Heart sounds: Normal heart sounds.   Pulmonary:      Effort: Pulmonary effort is normal. No respiratory distress.      Breath sounds: Normal breath sounds.   Abdominal:      General: Bowel sounds are normal. There is no distension.      Palpations: Abdomen is soft. There is no mass.      Tenderness: There is no abdominal tenderness. There is no right CVA tenderness or left CVA tenderness.      Hernia: No hernia is present.   Musculoskeletal:         General: No tenderness or signs of injury. Normal range of motion.      Cervical back: Normal range of motion and neck supple.      Right lower leg: No edema.      Left lower leg: No edema.   Lymphadenopathy:      Cervical: No cervical adenopathy.   Skin:     General: Skin is warm and dry.      Capillary Refill: Capillary refill takes less than 2 seconds.      Findings: No erythema or lesion.   Neurological:      General: No focal deficit present.      Mental Status: She is alert and oriented to person, place, and time. Mental status is at baseline.   Psychiatric:         Attention and Perception: Attention and perception normal.         Mood and Affect: Mood is depressed.         Speech: Speech normal.         Behavior: Behavior normal.         Thought Content: Thought content normal.         Judgment: Judgment normal.          Labs:   Lab Results   Component Value Date    WBC 5.81 01/29/2025    HGB 13.9 01/29/2025    HCT 42.7 01/29/2025    MCV 94.5 (H) 01/29/2025     01/29/2025       CMP  Sodium   Date Value Ref Range Status   01/29/2025 137 136 - 145 mmol/L Final   04/19/2022 137 136 - 145 mmol/L Final     Potassium   Date Value Ref Range Status   01/29/2025 4.2 3.5 - 5.1 mmol/L Final   04/19/2022 3.4 (L) 3.5 - 5.1 mmol/L Final     Chloride   Date Value Ref Range Status   01/29/2025 106 98 - 107 mmol/L  Final   04/19/2022 107 95 - 110 mmol/L Final     CO2   Date Value Ref Range Status   01/29/2025 26 23 - 31 mmol/L Final   04/19/2022 20 (L) 23 - 29 mmol/L Final     Glucose   Date Value Ref Range Status   04/19/2022 131 (H) 70 - 110 mg/dL Final     BUN   Date Value Ref Range Status   04/19/2022 23 8 - 23 mg/dL Final     Blood Urea Nitrogen   Date Value Ref Range Status   01/29/2025 17.2 9.8 - 20.1 mg/dL Final     Creatinine   Date Value Ref Range Status   01/29/2025 0.94 0.55 - 1.02 mg/dL Final   04/19/2022 1.1 0.5 - 1.4 mg/dL Final     Calcium   Date Value Ref Range Status   01/29/2025 8.9 8.4 - 10.2 mg/dL Final   04/19/2022 8.9 8.7 - 10.5 mg/dL Final     Total Protein   Date Value Ref Range Status   04/19/2022 7.4 6.0 - 8.4 g/dL Final     Albumin   Date Value Ref Range Status   01/29/2025 3.2 (L) 3.4 - 4.8 g/dL Final   04/19/2022 2.6 (L) 3.5 - 5.2 g/dL Final     Total Bilirubin   Date Value Ref Range Status   04/19/2022 1.1 (H) 0.1 - 1.0 mg/dL Final     Comment:     For infants and newborns, interpretation of results should be based  on gestational age, weight and in agreement with clinical  observations.    Premature Infant recommended reference ranges:  Up to 24 hours.............<8.0 mg/dL  Up to 48 hours............<12.0 mg/dL  3-5 days..................<15.0 mg/dL  6-29 days.................<15.0 mg/dL    For patients on Eltrombopag therapy, use of Dimension [place] TBIL is   not   recommended.       Bilirubin Total   Date Value Ref Range Status   01/29/2025 0.4 <=1.5 mg/dL Final     Alkaline Phosphatase   Date Value Ref Range Status   04/19/2022 82 55 - 135 U/L Final     ALP   Date Value Ref Range Status   01/29/2025 120 40 - 150 unit/L Final     AST   Date Value Ref Range Status   01/29/2025 94 (H) 5 - 34 unit/L Final   04/19/2022 58 (H) 10 - 40 U/L Final     ALT   Date Value Ref Range Status   01/29/2025 88 (H) 0 - 55 unit/L Final   04/19/2022 52 (H) 10 - 44 U/L Final     Anion Gap   Date Value Ref Range  "Status   04/19/2022 10 8 - 16 mmol/L Final     eGFR   Date Value Ref Range Status   01/29/2025 >60 mL/min/1.73/m2 Final     Comment:     Estimated GFR calculated using the CKD-EPI creatinine (2021) equation.     Lab Results   Component Value Date    TSH 0.560 11/07/2013     HCV Genotype   Date Value Ref Range Status   01/29/2025 1a (A) Undetected Final     Comment:        -------------------ADDITIONAL INFORMATION-------------------  This test was performed using the Abbott RealTime HCV   Genotype II assay ([employer] [place], [place]).     Test Performed by:  [place] [address]  : Dayne Conde Ph.D.; CLIA# [id number]     HCV RNA Detect/Quant   Date Value Ref Range Status   01/29/2025 8486898 (A) Undetected IU/mL Final     Comment:     Result in log IU/mL is 6.49.     -------------------ADDITIONAL INFORMATION-------------------  The quantification range of this assay is 15 to 100,000,000   IU/mL (1.18 log to 8.00 log IU/mL). Testing was performed   using the cas HCV test ([employer]     Test Performed by:  [place] [address]  : Dayne Conde Ph.D.; CLIA# [id number]     HIV   Date Value Ref Range Status   01/29/2025 Nonreactive Nonreactive Final     INR   Date Value Ref Range Status   01/29/2025 1.1 <=1.3 Final   01/25/2010 0.9 0.8 - 1.2 Final     Comment:     ACCP Guideline for Coumadin usage recommends a "target range" of  2.0 - 3.0 for INR for all indicators except mechanical heart valves  and antiphospholipid syndromes which should use 2.5 - 3.5.  .       Syphilis Antibody   Date Value Ref Range Status   01/29/2025 Reactive (A) Nonreactive, Equivocal Final     Hep B S Ab   Date Value Ref Range Status   11/25/2013 Negative  Final     Hep BsAb Interp   Date Value Ref Range Status   01/29/2025 " Nonreactive Nonreactive Final     Hep BsAb   Date Value Ref Range Status   01/29/2025 0.47 mIU/mL Final     Comment:     Interpretive Data Hep Bs Ab#  Result (mIU/mL)Interpretation - Hep B Surface Antibody  <8.00Nonreactive: Patient considered not immune to HBV infection  >=8.00 to <12.00Grayzone: Unable to determine immune status. Follow-up testing should be performed  >=12.00Reactive: Patient considered immune to HBV infection       Hep B Core Total Ab   Date Value Ref Range Status   11/25/2013 Negative  Final     Hep BcAb Interp   Date Value Ref Range Status   01/29/2025 Nonreactive Nonreactive Final     Hepatitis A Antibody IgG   Date Value Ref Range Status   11/25/2013 Negative  Final     Hep A IgG Interp   Date Value Ref Range Status   01/29/2025 Nonreactive Nonreactive Final     FibroTest Stage   Date Value Ref Range Status   01/29/2025 F4  Final     ActiTest Grade   Date Value Ref Range Status   01/29/2025 A3  Final     Alpha-2-Macroglobulin   Date Value Ref Range Status   01/29/2025 411 (H) 100 - 280 mg/dL Final     Haptoglobin   Date Value Ref Range Status   01/29/2025 51 30 - 200 mg/dL Final     Alanine Aminotransferase (ALT)   Date Value Ref Range Status   01/29/2025 89 (H) 7 - 45 U/L Final     Gamma Glutamyltransferase (GGT)   Date Value Ref Range Status   01/29/2025 213 (H) 5 - 36 U/L Final     Bilirubin, Total   Date Value Ref Range Status   01/29/2025 0.3 0.0 - 1.2 mg/dL Final     Comment:        Test Performed by:  [facility]  [address] [place] [address]  : Dayne Conde Ph.D.; CLIA# [id number]     Test Performed by:  [facility] [place]  [address] [place] [address]  : Dayne Conde Ph.D.; CLIA# [id number]     Ferritin   Date Value Ref Range Status   01/07/2010 28 20 - 300 ng/ml Final     Ferritin Level   Date Value Ref Range Status   01/29/2025 820.51 (H) 4.63 - 204.00  ng/mL Final     Antinuclear Ab, HEp-2 Substrate   Date Value Ref Range Status   01/29/2025 <1:80 (Negative) <1:80 (Negative) Final     Comment:        -------------------ADDITIONAL INFORMATION-------------------  Method: Immunofluorescence using HEp-2 cellular substrate.     Test Performed by:  [facility]  [address] [place] [address]  : Dayne Conde Ph.D.; CLIA# [id number]       Imaging: Reviewed most recent relevant imaging studies available, notable results highlighted in this note      Medications:     Current Outpatient Medications   Medication Instructions    acetaminophen (TYLENOL) 1,000 mg, Oral, Every 8 hours PRN    ALPRAZolam (XANAX) 0.5 mg    ARIPiprazole (ABILIFY) 10 mg, Nightly    buprenorphine HCL (SUBUTEX) 8 mg, 3 times daily    dextroamphetamine-amphetamine 10 mg Tab 1 tablet, 2 times daily    DULoxetine (CYMBALTA) 60 mg    fluocinonide (LIDEX) 0.05 % external solution apply TO SCALP as a leave-on treatment TWICE DAILY FOR UP TO TWO WEEKS THEN AS NEEDED FOR ITCHING    glecaprevir-pibrentasvir (MAVYRET) 100-40 mg Tab 3 tablets, Oral, Daily    hydrOXYzine pamoate (VISTARIL) 50 mg, 3 times daily PRN    isosorbide mononitrate (IMDUR) 30 mg, Oral, Daily    ketoconazole (NIZORAL) 2 % shampoo Every other day    ketorolac (TORADOL) 10 mg, Every 6 hours    levothyroxine (SYNTHROID) 25 mcg    lisinopriL (PRINIVIL,ZESTRIL) 40 MG tablet 1 tablet Orally Once a day for 30 days    meloxicam (MOBIC) 7.5 MG tablet No dose, route, or frequency recorded.    mirtazapine (REMERON) 7.5 mg, Nightly    mupirocin (BACTROBAN) 2 % ointment Topical (Top), 2 times daily    ondansetron (ZOFRAN-ODT) 8 mg    OXcarbazepine (TRILEPTAL) 300 MG Tab 1 tablet Orally Twice a day    traZODone (DESYREL) 50 mg, Nightly       Assessment:       1. Chronic hepatitis C without hepatic coma  -     glecaprevir-pibrentasvir (MAVYRET) 100-40 mg Tab; Take 3 tablets by mouth  Daily.  Dispense: 84 tablet; Refill: 1    2. Latent syphilis  -     penicillin G benzathine (BICILLIN LA) injection 2.4 Million Units    3. Cigarette nicotine dependence without complication          Plan:      Chronic hepatitis C without hepatic coma  -     glecaprevir-pibrentasvir (MAVYRET) 100-40 mg Tab; Take 3 tablets by mouth Daily.  Dispense: 84 tablet; Refill: 1  Long discussion regarding available treatment options to include potential risk versus benefits as well as potential adverse effects. Reviewed trial data to include AASLD/IDSA guidelines and available studies - excellent cure rates for pangenotypic disease with Mavyret.   Pt is not a candidate for Epclusa due to Trileptal.  All questions addressed at length. Patient voices understanding and is in agreement to proceed. Will plan to start Mavyret today. Refer to PAP for medication assistance if needed. Formal HCV  RN education visit today. Return to clinic 4 weeks after starting meds. HCV  to order all labs and follow-up per HCV protocol.     Latent syphilis  -     penicillin G benzathine (BICILLIN LA) injection 2.4 Million Units  RPR 1:32 1/29/25  Bicillin LA 2.4 million units weekly x 3 with first injection today     Cigarette nicotine dependence without complication  Spent approx 3 minutes discussing smoking cessation   Pt is not ready to quit.  Discussed benefits of quitting: improved overall health, decreased cardiac/vascular/pulmonary/stroke risks, as well as cost savings.       31 minutes of total time spent on the encounter, which includes face to face time and non-face to face time preparing to see the patient (eg, review of tests), Obtaining and/or reviewing separately obtained history, Documenting clinical information in the electronic or other health record, Independently interpreting results (not separately reported) and communicating results to the patient/family/caregiver, or Care coordination (not separately  reported).

## 2025-03-07 NOTE — LETTER
02/03/25        [facility] [address] [address]  Phone: [phone]       Patient: Zully Jerry   YOB: 1960  Date of Visit: 02/03//2025    To Whom It May Concern:    Zully Jerry  was at [facility] on 02/03/2025. She may return to work on 02/03/25 with no restrictions. If you have any questions or concerns, or if I can be of further assistance, please do not hesitate to contact me.    Sincerely,  KELLIE Neely

## 2025-03-14 ENCOUNTER — CLINICAL SUPPORT (OUTPATIENT)
Dept: INFECTIOUS DISEASES | Facility: CLINIC | Age: 65
End: 2025-03-14
Payer: MEDICARE

## 2025-03-14 DIAGNOSIS — A53.9 SYPHILIS: Primary | ICD-10-CM

## 2025-03-21 ENCOUNTER — CLINICAL SUPPORT (OUTPATIENT)
Dept: INFECTIOUS DISEASES | Facility: CLINIC | Age: 65
End: 2025-03-21
Payer: MEDICARE

## 2025-03-21 DIAGNOSIS — A53.9 SYPHILIS: Primary | ICD-10-CM

## 2025-03-21 PROCEDURE — 99211 OFF/OP EST MAY X REQ PHY/QHP: CPT | Mod: PBBFAC

## 2025-03-21 NOTE — PROGRESS NOTES
Patient came in for scheduled Bicillin injection as ordered by Racquel Tinoco NP. Bicillin 2.4 million units given IM to both buttocks (1.2 million units per buttock) without difficulty. Patient tolerated well. To contact the clinic prn.  to contact patient when Hep C treatment is approved. Verbalized understanding.

## 2025-04-07 ENCOUNTER — PROCEDURE VISIT (OUTPATIENT)
Dept: GASTROENTEROLOGY | Facility: CLINIC | Age: 65
End: 2025-04-07
Payer: MEDICARE

## 2025-04-07 DIAGNOSIS — B18.2 CHRONIC HEPATITIS C WITHOUT HEPATIC COMA: ICD-10-CM

## 2025-04-07 PROCEDURE — 91200 LIVER ELASTOGRAPHY: CPT | Mod: PBBFAC | Performed by: STUDENT IN AN ORGANIZED HEALTH CARE EDUCATION/TRAINING PROGRAM

## 2025-04-07 NOTE — PROGRESS NOTES
Patient here for FibroScan visit. Reports NPO X3 hours. Position patient for the procedure to expose the right rib cage. Explained procedure to the patient. FibroScan exam complete and was tolerated without any problems.

## 2025-04-08 ENCOUNTER — TELEPHONE (OUTPATIENT)
Dept: INFECTIOUS DISEASES | Facility: CLINIC | Age: 65
End: 2025-04-08
Payer: MEDICARE

## 2025-04-08 NOTE — TELEPHONE ENCOUNTER
XIAO:  Darlene Alfred RN reported Ms Andrea had her fibroscan done yesterday and c/o of itching like crazy.

## 2025-04-08 NOTE — PROCEDURES
Fibroscan Procedure     Name: Zully Jerry  Date of Procedure :   Interpreting Physician: Yony Estrada MD  Diagnosis: HCV    Probe: M    Fibroscan readin.7 kPa    Fibrosis: F4     CAP readin dB/m    Steatosis: S1      Miscellaneous:

## 2025-04-15 ENCOUNTER — TELEPHONE (OUTPATIENT)
Dept: INFECTIOUS DISEASES | Facility: CLINIC | Age: 65
End: 2025-04-15
Payer: MEDICARE

## 2025-04-15 NOTE — TELEPHONE ENCOUNTER
----- Message from KELLIE Neely sent at 4/14/2025  4:02 PM CDT -----  Reviewed chart and contacted patient with results. She states she has not recd her Hep C medications. Can you please check the status? KELLIE Neely-BC  ----- Message -----  From: Lillian Grant LPN  Sent: 4/14/2025   3:56 PM CDT  To: Moiz Anderson LPN; KELLIE Escalante      ----- Message -----  From: Harpal Miles  Sent: 4/14/2025   2:47 PM CDT  To: [facility] Infectious Disease Clinical Support Sta#    Patient of Rex called wanting to know the results of the Fibro Scan. Patient also does not have a follow up, please advise if needed.[phone]

## 2025-04-16 NOTE — TELEPHONE ENCOUNTER
Return call received from pt and I informed pt medication should be available next week and I will contact her once its available.     Pt verbalizes understanding

## 2025-04-21 ENCOUNTER — CLINICAL SUPPORT (OUTPATIENT)
Dept: INFECTIOUS DISEASES | Facility: CLINIC | Age: 65
End: 2025-04-21
Payer: MEDICARE

## 2025-04-21 DIAGNOSIS — B18.2 CHRONIC HEPATITIS C WITHOUT HEPATIC COMA: Primary | ICD-10-CM

## 2025-04-21 NOTE — PROGRESS NOTES
Patient here to  Mavyret 100/400 mg and education, she was provided with the following instructions:    Start Mavyret 100/400 mg take 3 tablets by mouth daily, preferably with evening meal  Drink plenty of fluids  Most common side effects are headache and fatigue, fatigue should improve with taking the medication  Avoid alcohol and acetaminophen, may take Ibuprofen unless contraindicated  Notify the clinic before starting any new medications, prescription and or over the counter  If a dose is missed DO NOT double up, continue with the next scheduled dose or contact the clinic  Contact [facility]  1 week prior to needing refill  Provided with appointment dates and times     All questions answered and patient verbalized understanding of all the above instructions, copy of the above information was provided to patient.

## 2025-04-22 DIAGNOSIS — B18.2 CHRONIC HEPATITIS C WITHOUT HEPATIC COMA: Primary | ICD-10-CM

## 2025-05-08 ENCOUNTER — OFFICE VISIT (OUTPATIENT)
Dept: INFECTIOUS DISEASES | Facility: CLINIC | Age: 65
End: 2025-05-08
Payer: MEDICARE

## 2025-05-08 VITALS
WEIGHT: 93.56 LBS | DIASTOLIC BLOOD PRESSURE: 76 MMHG | HEART RATE: 78 BPM | RESPIRATION RATE: 16 BRPM | HEIGHT: 62 IN | BODY MASS INDEX: 17.22 KG/M2 | TEMPERATURE: 98 F | SYSTOLIC BLOOD PRESSURE: 132 MMHG

## 2025-05-08 DIAGNOSIS — B18.2 CHRONIC HEPATITIS C WITHOUT HEPATIC COMA: Primary | ICD-10-CM

## 2025-05-08 DIAGNOSIS — A53.0 LATENT SYPHILIS: ICD-10-CM

## 2025-05-08 DIAGNOSIS — F17.210 CIGARETTE NICOTINE DEPENDENCE WITHOUT COMPLICATION: ICD-10-CM

## 2025-05-08 PROCEDURE — 99214 OFFICE O/P EST MOD 30 MIN: CPT | Mod: PBBFAC | Performed by: NURSE PRACTITIONER

## 2025-05-08 RX ORDER — ONDANSETRON HYDROCHLORIDE 8 MG/1
8 TABLET, FILM COATED ORAL EVERY 8 HOURS PRN
COMMUNITY
Start: 2025-04-22

## 2025-05-08 RX ORDER — DUPILUMAB 300 MG/2ML
INJECTION, SOLUTION SUBCUTANEOUS
COMMUNITY
Start: 2025-04-15 | End: 2025-05-08

## 2025-05-08 NOTE — PROGRESS NOTES
Patient ID: Zully Jerry 65 y.o.     Chief Complaint:   Chief Complaint   Patient presents with    Follow-up     Hep C        HPI:    5/8/25  Zully Jerry is a 64 y/o WF here for Hep C f/u. She was started on Mavyret 3 tabs po q day x 8 weeks on 4/21/25. Pt admits to missing 2 doses so she is 2 days behind. Currently around week 4 of treatment. Pt is tolerating the medication well. She states she is having some issues sleeping, but recently moved to her own place 1.5 weeks ago. She states she is slowly adjusting. She also tells me she has been taking Adderall to help her at her job.  Pt denies fever, headache, chills, visual problems, icterus, jaundice, N/V/D, esophageal varices, SOB, cough, abd pain, ascites or caron colored stools.  Pretreatment labs collected 1/29/25 Hep C VL 3,110,000, Gt1a, F4, AST 94, ALK 88, RPR 32 dils, HIV neg, DIANE neg, Hep A IGG NR, Hep B s ab NR. Liver US from 2/3/25 showed changes of cirrhosis. Hx of latent syphilis. Treated with Bicillin LA 2.4 million units weekly x 3 (3/7/25, 3/14/25, 3/21/25).  Pt smokes cigarettes daily and is not interested in quitting. BMI 17.      3/7/25  Zully Jerry is a 64 y/o WF here for Hep C f/u. She was recently discharged from [facility] where she stayed for 30 days due to depression. Pt is tearful today, but states she is doing better. Denies HI or SI. She is very eager to start Hep C treatment. Dx 2009.  Hx of IVDU in the past. States she still uses cocaine and THC infrequently.  Denies alcohol use. Pt is treatment naive. Reviewed pretreatment labs collected 1/29/25 Hep C VL 3,110,000, Gt1a, F4, AST 94, ALK 88, RPR 32 dils, HIV neg, IDANE neg, Hep A IGG NR, Hep B s ab NR. Liver US from 2/3/25 showed changes of cirrhosis.  Pt was noted to have latent syphilis with this blood work. She recd Bicillin LA 2.4 million units x 1 2/3/25, but did not receive the other two injections. This will need to be restarted. Pt endorses the rash on her  face cleared with one round of Bicillin. She denies fever, headache, chills, visual problems, icterus, jaundice, N/V/D, esophageal varices, SOB, cough, abd pain, ascites or caron colored stools.  Reviewed meds. Pt is on Trileptal so she will need to be treated with Mavyret. She is agreeable. Pt smokes cigarettes daily.           Past Medical History:   Diagnosis Date    Bipolar affective     [facility]    Degenerative disc disease     cervical spine    GERD (gastroesophageal reflux disease)     Hepatitis C     Genotype 1a, no previous tx, G1S1 on Bx 2/2010    Hyperlipidemia     Hypertension     Migraine headache     Raynaud's disease         Past Surgical History:   Procedure Laterality Date    BACK SURGERY      BREAST SURGERY      lumpectomy    TUBAL LIGATION      one tube and ovary removed for cyst        Social History     Socioeconomic History    Marital status:     Number of children: 3   Occupational History    Occupation: [employer]     Employer: [employer]   Tobacco Use    Smoking status: Every Day     Current packs/day: 0.00     Types: Cigarettes     Last attempt to quit: 10/16/2014     Years since quitting: 10.5    Smokeless tobacco: Never   Substance and Sexual Activity    Alcohol use: No     Comment: quit ~10-12 yrs ago; previously heavy use    Drug use: No    Sexual activity: Yes     Partners: Male     Birth control/protection: Post-menopausal        Family History   Problem Relation Name Age of Onset    Hyperlipidemia Mother      Diabetes Mother      Kidney disease Mother      Heart disease Mother      Heart disease Father      Diabetes Father      Cancer Sister          not sure  but knows its female Ca.    Cancer Paternal Grandmother          uterine Cancer        Review of patient's allergies indicates:   Allergen Reactions    Abilify [aripiprazole] Hallucinations        Immunization History   Administered Date(s) Administered    COVID-19, MRNA, LN-S,  "PF (Pfizer) (Purple Cap) 07/06/2021    Hepatitis A, Adult 01/05/2010    Hepatitis B, Adult 01/05/2010    Influenza - Quadrivalent 10/12/2017    Influenza - Quadrivalent - PF *Preferred* (6 months and older) 09/09/2014, 10/12/2017    Influenza - Trivalent - Afluria, Fluzone MDV 01/06/2010, 11/07/2013    Influenza - Trivalent - Fluarix, Flulaval, Fluzone, Afluria - PF 09/09/2014    Influenza A (H1N1) 2009 Monovalent - IM 01/06/2010    Influenza Split 01/06/2010, 11/07/2013    Tdap 02/11/2019, 12/24/2020        Review of Systems   Constitutional: Negative.    HENT: Negative.     Eyes: Negative.    Respiratory: Negative.     Cardiovascular: Negative.    Gastrointestinal: Negative.    Genitourinary: Negative.    Musculoskeletal: Negative.    Skin: Negative.    Neurological: Negative.    Endo/Heme/Allergies: Negative.    Psychiatric/Behavioral: Negative.     All other systems reviewed and are negative.         Objective:      /76   Pulse 78   Temp 97.9 °F (36.6 °C) (Oral)   Resp 16   Ht 5' 2" (1.575 m)   Wt 42.4 kg (93 lb 9.4 oz)   BMI 17.12 kg/m²      Physical Exam  Vitals reviewed.   Constitutional:       General: She is not in acute distress.     Appearance: Normal appearance.   HENT:      Head: Normocephalic.      Right Ear: External ear normal.      Left Ear: External ear normal.      Nose: Nose normal.      Mouth/Throat:      Mouth: Mucous membranes are moist.      Pharynx: Oropharynx is clear.   Eyes:      General: No scleral icterus.     Extraocular Movements: Extraocular movements intact.      Conjunctiva/sclera: Conjunctivae normal.      Pupils: Pupils are equal, round, and reactive to light.   Cardiovascular:      Rate and Rhythm: Normal rate and regular rhythm.      Pulses: Normal pulses.      Heart sounds: Normal heart sounds.   Pulmonary:      Effort: Pulmonary effort is normal. No respiratory distress.      Breath sounds: Normal breath sounds.   Abdominal:      General: Bowel sounds are normal. " There is no distension.      Palpations: Abdomen is soft. There is no mass.      Tenderness: There is no abdominal tenderness. There is no right CVA tenderness or left CVA tenderness.      Hernia: No hernia is present.   Musculoskeletal:         General: No tenderness or signs of injury. Normal range of motion.      Cervical back: Normal range of motion and neck supple.      Right lower leg: No edema.      Left lower leg: No edema.   Lymphadenopathy:      Cervical: No cervical adenopathy.   Skin:     General: Skin is warm and dry.      Capillary Refill: Capillary refill takes less than 2 seconds.      Findings: No erythema or lesion.      Comments: Skin lesions from picking noted to bilateral arms   Neurological:      General: No focal deficit present.      Mental Status: She is alert and oriented to person, place, and time. Mental status is at baseline.   Psychiatric:         Mood and Affect: Mood normal.         Behavior: Behavior normal.         Thought Content: Thought content normal.         Judgment: Judgment normal.          Labs:   Lab Results   Component Value Date    WBC 5.81 01/29/2025    HGB 13.9 01/29/2025    HCT 42.7 01/29/2025    MCV 94.5 (H) 01/29/2025     01/29/2025       CMP  Sodium   Date Value Ref Range Status   01/29/2025 137 136 - 145 mmol/L Final   04/19/2022 137 136 - 145 mmol/L Final     Potassium   Date Value Ref Range Status   01/29/2025 4.2 3.5 - 5.1 mmol/L Final   04/19/2022 3.4 (L) 3.5 - 5.1 mmol/L Final     Chloride   Date Value Ref Range Status   01/29/2025 106 98 - 107 mmol/L Final   04/19/2022 107 95 - 110 mmol/L Final     CO2   Date Value Ref Range Status   01/29/2025 26 23 - 31 mmol/L Final   04/19/2022 20 (L) 23 - 29 mmol/L Final     Glucose   Date Value Ref Range Status   01/29/2025 180 (H) 82 - 115 mg/dL Final   04/19/2022 131 (H) 70 - 110 mg/dL Final     BUN   Date Value Ref Range Status   04/19/2022 23 8 - 23 mg/dL Final     Blood Urea Nitrogen   Date Value Ref Range  Status   01/29/2025 17.2 9.8 - 20.1 mg/dL Final     Creatinine   Date Value Ref Range Status   01/29/2025 0.94 0.55 - 1.02 mg/dL Final   04/19/2022 1.1 0.5 - 1.4 mg/dL Final     Calcium   Date Value Ref Range Status   01/29/2025 8.9 8.4 - 10.2 mg/dL Final   04/19/2022 8.9 8.7 - 10.5 mg/dL Final     Protein Total   Date Value Ref Range Status   01/29/2025 7.8 (H) 5.8 - 7.6 gm/dL Final     Total Protein   Date Value Ref Range Status   04/19/2022 7.4 6.0 - 8.4 g/dL Final     Albumin   Date Value Ref Range Status   01/29/2025 3.2 (L) 3.4 - 4.8 g/dL Final   04/19/2022 2.6 (L) 3.5 - 5.2 g/dL Final     Total Bilirubin   Date Value Ref Range Status   04/19/2022 1.1 (H) 0.1 - 1.0 mg/dL Final     Comment:     For infants and newborns, interpretation of results should be based  on gestational age, weight and in agreement with clinical  observations.    Premature Infant recommended reference ranges:  Up to 24 hours.............<8.0 mg/dL  Up to 48 hours............<12.0 mg/dL  3-5 days..................<15.0 mg/dL  6-29 days.................<15.0 mg/dL    For patients on Eltrombopag therapy, use of Dimension Louin TBIL is   not   recommended.       Bilirubin Total   Date Value Ref Range Status   01/29/2025 0.4 <=1.5 mg/dL Final     Alkaline Phosphatase   Date Value Ref Range Status   04/19/2022 82 55 - 135 U/L Final     ALP   Date Value Ref Range Status   01/29/2025 120 40 - 150 unit/L Final     AST   Date Value Ref Range Status   01/29/2025 94 (H) 5 - 34 unit/L Final   04/19/2022 58 (H) 10 - 40 U/L Final     ALT   Date Value Ref Range Status   01/29/2025 88 (H) 0 - 55 unit/L Final   04/19/2022 52 (H) 10 - 44 U/L Final     Anion Gap   Date Value Ref Range Status   04/19/2022 10 8 - 16 mmol/L Final     eGFR   Date Value Ref Range Status   01/29/2025 >60 mL/min/1.73/m2 Final     Comment:     Estimated GFR calculated using the CKD-EPI creatinine (2021) equation.     Lab Results   Component Value Date    TSH 0.560 11/07/2013  "    HCV Genotype   Date Value Ref Range Status   01/29/2025 1a (A) Undetected Final     Comment:        -------------------ADDITIONAL INFORMATION-------------------  This test was performed using the Abbott RealTime HCV   Genotype II assay ([employer] [place], [place]).     Test Performed by:  [place] [address]  : Dayne Conde Ph.D.; CLIA# [id number]     HCV RNA Detect/Quant   Date Value Ref Range Status   01/29/2025 5549270 (A) Undetected IU/mL Final     Comment:     Result in log IU/mL is 6.49.     -------------------ADDITIONAL INFORMATION-------------------  The quantification range of this assay is 15 to 100,000,000   IU/mL (1.18 log to 8.00 log IU/mL). Testing was performed   using the cas HCV test ([employer]     Test Performed by:  [place] [address]  : Dayne Conde Ph.D.; CLIA# [id number]     HIV   Date Value Ref Range Status   01/29/2025 Nonreactive Nonreactive Final     PT   Date Value Ref Range Status   01/29/2025 13.8 11.4 - 14.0 seconds Final     INR   Date Value Ref Range Status   01/29/2025 1.1 <=1.3 Final   01/25/2010 0.9 0.8 - 1.2 Final     Comment:     ACCP Guideline for Coumadin usage recommends a "target range" of  2.0 - 3.0 for INR for all indicators except mechanical heart valves  and antiphospholipid syndromes which should use 2.5 - 3.5.  .       Syphilis Antibody   Date Value Ref Range Status   01/29/2025 Reactive (A) Nonreactive, Equivocal Final     Hep B S Ab   Date Value Ref Range Status   11/25/2013 Negative  Final     Hep BsAb Interp   Date Value Ref Range Status   01/29/2025 Nonreactive Nonreactive Final     Hep BsAb   Date Value Ref Range Status   01/29/2025 0.47 mIU/mL Final     Comment:     Interpretive Data Hep Bs Ab#  Result (mIU/mL)Interpretation - Hep B " Surface Antibody  <8.00Nonreactive: Patient considered not immune to HBV infection  >=8.00 to <12.00Grayzone: Unable to determine immune status. Follow-up testing should be performed  >=12.00Reactive: Patient considered immune to HBV infection       Hep B Core Total Ab   Date Value Ref Range Status   11/25/2013 Negative  Final     Hep BcAb Interp   Date Value Ref Range Status   01/29/2025 Nonreactive Nonreactive Final     Hepatitis A Antibody IgG   Date Value Ref Range Status   11/25/2013 Negative  Final     Hep A IgG Interp   Date Value Ref Range Status   01/29/2025 Nonreactive Nonreactive Final     FibroTest Stage   Date Value Ref Range Status   01/29/2025 F4  Final     ActiTest Grade   Date Value Ref Range Status   01/29/2025 A3  Final     Alpha-2-Macroglobulin   Date Value Ref Range Status   01/29/2025 411 (H) 100 - 280 mg/dL Final     Haptoglobin   Date Value Ref Range Status   01/29/2025 51 30 - 200 mg/dL Final     Alanine Aminotransferase (ALT)   Date Value Ref Range Status   01/29/2025 89 (H) 7 - 45 U/L Final     Gamma Glutamyltransferase (GGT)   Date Value Ref Range Status   01/29/2025 213 (H) 5 - 36 U/L Final     Bilirubin, Total   Date Value Ref Range Status   01/29/2025 0.3 0.0 - 1.2 mg/dL Final     Comment:        Test Performed by:  [facility]  [address] [place] [address]  : Dayne Conde Ph.D.; CLIA# [id number]     Test Performed by:  [facility]  [address] [place] [address]  : Dayne Conde Ph.D.; CLIA# [id number]     Ferritin   Date Value Ref Range Status   01/07/2010 28 20 - 300 ng/ml Final     Ferritin Level   Date Value Ref Range Status   01/29/2025 820.51 (H) 4.63 - 204.00 ng/mL Final     Antinuclear Ab, HEp-2 Substrate   Date Value Ref Range Status   01/29/2025 <1:80 (Negative) <1:80 (Negative) Final     Comment:        -------------------ADDITIONAL  INFORMATION-------------------  Method: Immunofluorescence using HEp-2 cellular substrate.     Test Performed by:  [facility]  [address] [place] [address]  : Dayne Conde Ph.D.; CLIA# [id number]       Imaging: Reviewed most recent relevant imaging studies available, notable results highlighted in this note      Medications:     Current Outpatient Medications   Medication Instructions    ALPRAZolam (XANAX) 0.5 mg    buprenorphine HCL (SUBUTEX) 8 mg, 3 times daily    dextroamphetamine-amphetamine 10 mg Tab 1 tablet, 2 times daily    DULoxetine (CYMBALTA) 60 mg    fluocinonide (LIDEX) 0.05 % external solution apply TO SCALP as a leave-on treatment TWICE DAILY FOR UP TO TWO WEEKS THEN AS NEEDED FOR ITCHING    glecaprevir-pibrentasvir (MAVYRET) 100-40 mg Tab 3 tablets, Oral, Daily    ketoconazole (NIZORAL) 2 % shampoo Every other day    ketorolac (TORADOL) 10 mg, Every 6 hours    lisinopriL (PRINIVIL,ZESTRIL) 40 MG tablet 1 tablet Orally Once a day for 30 days    ondansetron (ZOFRAN) 8 mg, Every 8 hours PRN    traZODone (DESYREL) 50 mg, Nightly       Assessment:       1. Chronic hepatitis C without hepatic coma    2. Latent syphilis    3. Cigarette nicotine dependence without complication          Plan:      Chronic hepatitis C without hepatic coma  Mavyret start date 4/21/25  Projected completion date 6/17/25  Continue meds as directed   Labs at completion of therapy   Pt encouraged to refrain from alcohol and illicit drug use.  Blood and sex precautions discussed. Do not share a needle, razor, nail clippers, toothbrush, or drug paraphernalia with anyone.  No Tylenol at doses greater than 2000 mg per day.    She is not interested in any vaccines    Latent syphilis  Baseline RPR 1:32 on 1/29/25  Bicillin LA 2.4 million units weekly x 3 (3/7/25, 3/14/25, 3/21/25).    Cigarette nicotine dependence without complication  Spent approx 3 minutes  discussing smoking cessation   Pt is not ready to quit.  Discussed benefits of quitting: improved overall health, decreased cardiac/vascular/pulmonary/stroke risks, as well as cost savings.         25 minutes of total time spent on the encounter, which includes face to face time and non-face to face time preparing to see the patient (eg, review of tests), Obtaining and/or reviewing separately obtained history, Documenting clinical information in the electronic or other health record, Independently interpreting results (not separately reported) and communicating results to the patient/family/caregiver, or Care coordination (not separately reported).

## 2025-07-31 ENCOUNTER — HOSPITAL ENCOUNTER (OUTPATIENT)
Dept: RADIOLOGY | Facility: HOSPITAL | Age: 65
Discharge: HOME | End: 2025-07-31
Attending: NURSE PRACTITIONER
Payer: MEDICARE

## 2025-07-31 ENCOUNTER — OFFICE VISIT (OUTPATIENT)
Dept: INFECTIOUS DISEASES | Facility: CLINIC | Age: 65
End: 2025-07-31
Payer: MEDICARE

## 2025-07-31 VITALS
BODY MASS INDEX: 16.65 KG/M2 | HEART RATE: 79 BPM | DIASTOLIC BLOOD PRESSURE: 83 MMHG | SYSTOLIC BLOOD PRESSURE: 123 MMHG | HEIGHT: 62 IN | TEMPERATURE: 98 F | RESPIRATION RATE: 16 BRPM | WEIGHT: 90.5 LBS

## 2025-07-31 DIAGNOSIS — K74.69 COMPENSATED CIRRHOSIS RELATED TO HEPATITIS C VIRUS (HCV): ICD-10-CM

## 2025-07-31 DIAGNOSIS — G89.29 CHRONIC RIGHT SHOULDER PAIN: ICD-10-CM

## 2025-07-31 DIAGNOSIS — B19.20 COMPENSATED CIRRHOSIS RELATED TO HEPATITIS C VIRUS (HCV): ICD-10-CM

## 2025-07-31 DIAGNOSIS — Z11.3 ROUTINE SCREENING FOR STI (SEXUALLY TRANSMITTED INFECTION): ICD-10-CM

## 2025-07-31 DIAGNOSIS — B18.2 CHRONIC HEPATITIS C WITHOUT HEPATIC COMA: Primary | ICD-10-CM

## 2025-07-31 DIAGNOSIS — M25.511 CHRONIC RIGHT SHOULDER PAIN: ICD-10-CM

## 2025-07-31 PROCEDURE — 99215 OFFICE O/P EST HI 40 MIN: CPT | Mod: PBBFAC,25 | Performed by: NURSE PRACTITIONER

## 2025-07-31 PROCEDURE — 73020 X-RAY EXAM OF SHOULDER: CPT | Mod: TC,RT

## 2025-07-31 RX ORDER — DEXTROAMPHETAMINE SACCHARATE, AMPHETAMINE ASPARTATE, DEXTROAMPHETAMINE SULFATE AND AMPHETAMINE SULFATE 5; 5; 5; 5 MG/1; MG/1; MG/1; MG/1
1 TABLET ORAL 2 TIMES DAILY
COMMUNITY
Start: 2025-07-23

## 2025-07-31 RX ORDER — IBUPROFEN 800 MG/1
800 TABLET, FILM COATED ORAL EVERY 8 HOURS PRN
COMMUNITY
Start: 2025-07-23

## 2025-07-31 RX ORDER — DUPILUMAB 300 MG/2ML
INJECTION, SOLUTION SUBCUTANEOUS
COMMUNITY
Start: 2025-05-12 | End: 2025-07-31
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
## 2025-08-01 ENCOUNTER — TELEPHONE (OUTPATIENT)
Dept: INFECTIOUS DISEASES | Facility: CLINIC | Age: 65
End: 2025-08-01
Payer: MEDICARE

## 2025-08-01 NOTE — TELEPHONE ENCOUNTER
----- Message from AMOS Hopson sent at 8/1/2025  1:48 PM CDT -----  Lab results reviewed for Racquel.  Please phone pt with results. RPR titer at 32 dils, unchanged from last visit.  She has appropriately completed Bicillin 2.4 mil units IM X doses.  If she has not had   any possible syphilis re-exposure, we will continue to monitor as it can take 1-2 years for labs to reflect appropriate decrease in titer.  IF she has possible re-exposure, she will need another dose   of Bicillin 2.4 mil units x 1.  Parter(s) would need treatment as well. Thank you.         ----- Message -----  From: Lab, Background User  Sent: 7/31/2025   1:08 PM CDT  To: KELLIE Escalante

## 2025-08-01 NOTE — TELEPHONE ENCOUNTER
----- Message from AMOS Hopson sent at 8/1/2025  1:02 PM CDT -----  XR results reviewed for Racquel. Patient does have some degeneration of the right shoulder evident on Xray.  She has already been referred to Ortho for same. Keep appt for evaluation and treatment once   appointed. Thank you.   ----- Message -----  From: Troy, Rad Results In  Sent: 7/31/2025   8:54 PM CDT  To: KELLIE Escalante

## 2025-08-04 NOTE — TELEPHONE ENCOUNTER
Patient informed of results and providers recommendations. All questions answered. Patient verbalized understanding.      Pt is unsure if she was re-exposed, she would like to be retreated.      PAR:   please place pt on the schedule for Wed 08/06/25 at 10:00 am for nurse visit Bicillin inj x1 (she knows about the appt)

## 2025-08-06 ENCOUNTER — CLINICAL SUPPORT (OUTPATIENT)
Dept: INFECTIOUS DISEASES | Facility: CLINIC | Age: 65
End: 2025-08-06
Payer: MEDICARE

## 2025-08-06 DIAGNOSIS — A53.9 SYPHILIS: Primary | ICD-10-CM

## 2025-08-06 PROCEDURE — 96372 THER/PROPH/DIAG INJ SC/IM: CPT | Mod: PBBFAC

## 2025-08-06 RX ADMIN — PENICILLIN G BENZATHINE 2.4 MILLION UNITS: 1200000 INJECTION, SUSPENSION INTRAMUSCULAR at 10:08

## 2025-08-13 ENCOUNTER — TELEPHONE (OUTPATIENT)
Dept: INFECTIOUS DISEASES | Facility: CLINIC | Age: 65
End: 2025-08-13
Payer: MEDICARE